# Patient Record
Sex: FEMALE | Race: WHITE | Employment: FULL TIME | URBAN - NONMETROPOLITAN AREA
[De-identification: names, ages, dates, MRNs, and addresses within clinical notes are randomized per-mention and may not be internally consistent; named-entity substitution may affect disease eponyms.]

---

## 2017-09-20 ENCOUNTER — HOSPITAL ENCOUNTER (EMERGENCY)
Age: 47
Discharge: HOME | End: 2017-09-20
Payer: COMMERCIAL

## 2017-09-20 VITALS
WEIGHT: 140 LBS | BODY MASS INDEX: 24.8 KG/M2 | DIASTOLIC BLOOD PRESSURE: 74 MMHG | RESPIRATION RATE: 16 BRPM | SYSTOLIC BLOOD PRESSURE: 126 MMHG | TEMPERATURE: 97.8 F | HEART RATE: 88 BPM | HEIGHT: 63 IN | OXYGEN SATURATION: 96 %

## 2017-09-20 DIAGNOSIS — J00 ACUTE NASOPHARYNGITIS: Primary | ICD-10-CM

## 2017-09-20 PROCEDURE — 99213 OFFICE O/P EST LOW 20 MIN: CPT | Performed by: NURSE PRACTITIONER

## 2017-09-20 PROCEDURE — 99212 OFFICE O/P EST SF 10 MIN: CPT

## 2017-09-20 RX ORDER — OMEGA-3/DHA/EPA/FISH OIL 1000 MG
1 CAPSULE ORAL DAILY
Qty: 30 CAPSULE | Refills: 0 | Status: SHIPPED | OUTPATIENT
Start: 2017-09-20 | End: 2017-10-20

## 2017-09-20 RX ORDER — AMOXICILLIN 500 MG/1
500 CAPSULE ORAL 3 TIMES DAILY
Qty: 30 CAPSULE | Refills: 0 | Status: SHIPPED | OUTPATIENT
Start: 2017-09-20 | End: 2017-09-30

## 2017-09-20 ASSESSMENT — ENCOUNTER SYMPTOMS
COUGH: 1
DIARRHEA: 0
ABDOMINAL PAIN: 0
SHORTNESS OF BREATH: 0
RHINORRHEA: 1
STRIDOR: 0
SINUS PRESSURE: 0
SINUS CONGESTION: 1
TROUBLE SWALLOWING: 0
CHOKING: 0
SORE THROAT: 1
EYE DISCHARGE: 0
CHEST TIGHTNESS: 0
VOICE CHANGE: 1
NAUSEA: 0

## 2017-09-20 ASSESSMENT — PAIN DESCRIPTION - ONSET: ONSET: GRADUAL

## 2017-09-20 ASSESSMENT — PAIN DESCRIPTION - PAIN TYPE: TYPE: ACUTE PAIN

## 2017-09-20 ASSESSMENT — PAIN DESCRIPTION - LOCATION: LOCATION: THROAT

## 2017-09-20 ASSESSMENT — PAIN SCALES - GENERAL: PAINLEVEL_OUTOF10: 2

## 2017-09-20 ASSESSMENT — PAIN DESCRIPTION - FREQUENCY: FREQUENCY: CONTINUOUS

## 2017-09-20 ASSESSMENT — PAIN DESCRIPTION - DESCRIPTORS: DESCRIPTORS: ACHING

## 2017-09-20 ASSESSMENT — PAIN DESCRIPTION - PROGRESSION: CLINICAL_PROGRESSION: GRADUALLY WORSENING

## 2017-12-18 ENCOUNTER — HOSPITAL ENCOUNTER (OUTPATIENT)
Dept: WOMENS IMAGING | Age: 47
Discharge: HOME | End: 2017-12-18
Payer: COMMERCIAL

## 2017-12-18 DIAGNOSIS — Z12.39 BREAST SCREENING: ICD-10-CM

## 2017-12-18 PROCEDURE — 77063 BREAST TOMOSYNTHESIS BI: CPT

## 2017-12-19 ENCOUNTER — OFFICE VISIT (OUTPATIENT)
Dept: FAMILY MEDICINE CLINIC | Age: 47
End: 2017-12-19
Payer: COMMERCIAL

## 2017-12-19 VITALS
OXYGEN SATURATION: 98 % | DIASTOLIC BLOOD PRESSURE: 64 MMHG | HEIGHT: 64 IN | HEART RATE: 75 BPM | WEIGHT: 144 LBS | TEMPERATURE: 98 F | SYSTOLIC BLOOD PRESSURE: 112 MMHG | BODY MASS INDEX: 24.59 KG/M2

## 2017-12-19 DIAGNOSIS — Z13.220 SCREENING FOR HYPERLIPIDEMIA: ICD-10-CM

## 2017-12-19 DIAGNOSIS — Z00.00 WELLNESS EXAMINATION: Primary | ICD-10-CM

## 2017-12-19 LAB
ALBUMIN SERPL-MCNC: 4.7 G/DL (ref 3.5–5.1)
ALP BLD-CCNC: 49 U/L (ref 38–126)
ALT SERPL-CCNC: 15 U/L (ref 11–66)
ANION GAP SERPL CALCULATED.3IONS-SCNC: 16 MEQ/L (ref 8–16)
AST SERPL-CCNC: 19 U/L (ref 5–40)
BACTERIA: NORMAL
BASOPHILS # BLD: 0.5 %
BASOPHILS ABSOLUTE: 0 THOU/MM3 (ref 0–0.1)
BILIRUB SERPL-MCNC: 1.7 MG/DL (ref 0.3–1.2)
BILIRUBIN URINE: NEGATIVE
BLOOD, URINE: NEGATIVE
BUN BLDV-MCNC: 11 MG/DL (ref 7–22)
CALCIUM SERPL-MCNC: 9.2 MG/DL (ref 8.5–10.5)
CASTS: NORMAL /LPF
CASTS: NORMAL /LPF
CHARACTER, URINE: NORMAL
CHLORIDE BLD-SCNC: 101 MEQ/L (ref 98–111)
CHOLESTEROL, FASTING: 209 MG/DL (ref 100–199)
CO2: 23 MEQ/L (ref 23–33)
COLOR: YELLOW
CREAT SERPL-MCNC: 0.7 MG/DL (ref 0.4–1.2)
CRYSTALS: NORMAL
EOSINOPHIL # BLD: 1.6 %
EOSINOPHILS ABSOLUTE: 0.1 THOU/MM3 (ref 0–0.4)
EPITHELIAL CELLS, UA: NORMAL /HPF
GFR SERPL CREATININE-BSD FRML MDRD: 90 ML/MIN/1.73M2
GLUCOSE BLD-MCNC: 95 MG/DL (ref 70–108)
GLUCOSE, URINE: NEGATIVE MG/DL
HCT VFR BLD CALC: 44 % (ref 37–47)
HDLC SERPL-MCNC: 82 MG/DL
HEMOGLOBIN: 14.7 GM/DL (ref 12–16)
KETONES, URINE: NEGATIVE
LDL CHOLESTEROL CALCULATED: 106 MG/DL
LEUKOCYTE ESTERASE, URINE: NEGATIVE
LYMPHOCYTES # BLD: 23.4 %
LYMPHOCYTES ABSOLUTE: 1.1 THOU/MM3 (ref 1–4.8)
MCH RBC QN AUTO: 30.7 PG (ref 27–31)
MCHC RBC AUTO-ENTMCNC: 33.5 GM/DL (ref 33–37)
MCV RBC AUTO: 91.7 FL (ref 81–99)
MISCELLANEOUS LAB TEST RESULT: NORMAL
MONOCYTES # BLD: 9 %
MONOCYTES ABSOLUTE: 0.4 THOU/MM3 (ref 0.4–1.3)
NITRITE, URINE: NEGATIVE
NUCLEATED RED BLOOD CELLS: 0 /100 WBC
PDW BLD-RTO: 13.3 % (ref 11.5–14.5)
PH UA: 7
PLATELET # BLD: 219 THOU/MM3 (ref 130–400)
PMV BLD AUTO: 8.4 MCM (ref 7.4–10.4)
POTASSIUM SERPL-SCNC: 4 MEQ/L (ref 3.5–5.2)
PROTEIN UA: NEGATIVE MG/DL
RBC # BLD: 4.79 MILL/MM3 (ref 4.2–5.4)
RBC URINE: NORMAL /HPF
RENAL EPITHELIAL, UA: NORMAL
SEG NEUTROPHILS: 65.5 %
SEGMENTED NEUTROPHILS ABSOLUTE COUNT: 3.1 THOU/MM3 (ref 1.8–7.7)
SODIUM BLD-SCNC: 140 MEQ/L (ref 135–145)
SPECIFIC GRAVITY UA: 1.02 (ref 1–1.03)
TOTAL PROTEIN: 7.9 G/DL (ref 6.1–8)
TRIGLYCERIDE, FASTING: 103 MG/DL (ref 0–199)
UROBILINOGEN, URINE: 0.2 EU/DL
WBC # BLD: 4.7 THOU/MM3 (ref 4.8–10.8)
WBC UA: NORMAL /HPF
YEAST: NORMAL

## 2017-12-19 PROCEDURE — 90471 IMMUNIZATION ADMIN: CPT | Performed by: FAMILY MEDICINE

## 2017-12-19 PROCEDURE — 90686 IIV4 VACC NO PRSV 0.5 ML IM: CPT | Performed by: FAMILY MEDICINE

## 2017-12-19 PROCEDURE — 99396 PREV VISIT EST AGE 40-64: CPT | Performed by: FAMILY MEDICINE

## 2017-12-19 ASSESSMENT — ENCOUNTER SYMPTOMS
STRIDOR: 0
BACK PAIN: 1
WHEEZING: 0
PHOTOPHOBIA: 0
SORE THROAT: 0
EYE DISCHARGE: 0
APNEA: 0
CHEST TIGHTNESS: 0
BLOOD IN STOOL: 0
VOMITING: 0
EYE ITCHING: 0
FACIAL SWELLING: 0
ABDOMINAL DISTENTION: 0
EYE REDNESS: 0
SHORTNESS OF BREATH: 0
CONSTIPATION: 0
RECTAL PAIN: 0
COLOR CHANGE: 0
CHOKING: 0
ABDOMINAL PAIN: 0
DIARRHEA: 0
SINUS PRESSURE: 0
TROUBLE SWALLOWING: 0
EYE PAIN: 0
COUGH: 0
RHINORRHEA: 0
ANAL BLEEDING: 0
VOICE CHANGE: 0
NAUSEA: 0

## 2017-12-19 NOTE — PATIENT INSTRUCTIONS
You may receive a survey about your visit with us today. The feedback from our patients helps us identify what is working well and where the service to all patients can be enhanced. Thank you! Patient Education        Learning About Easton Patterson  What is a living will? A living will is a legal form you use to write down the kind of care you want at the end of your life. It is used by the health professionals who will treat you if you aren't able to decide for yourself. If you put your wishes in writing, your loved ones and others will know what kind of care you want. They won't need to guess. This can ease your mind and be helpful to others. A living will is not the same as an estate or property will. An estate will explains what you want to happen with your money and property after you die. Is a living will a legal document? A living will is a legal document. Each state has its own laws about living nielsen. If you move to another state, make sure that your living will is legal in the state where you now live. Or you might use a universal form that has been approved by many states. This kind of form can sometimes be completed and stored online. Your electronic copy will then be available wherever you have a connection to the Internet. In most cases, doctors will respect your wishes even if you have a form from a different state. · You don't need an  to complete a living will. But legal advice can be helpful if your state's laws are unclear, your health history is complicated, or your family can't agree on what should be in your living will. · You can change your living will at any time. Some people find that their wishes about end-of-life care change as their health changes. · In addition to making a living will, think about completing a medical power of  form.  This form lets you name the person you want to make end-of-life treatment decisions for you (your \"health care agent\") if you're you, such as signs of a severe allergic reaction, very high fever, or unusual behavior. Signs of a severe allergic reaction can include hives, swelling of the face and throat, difficulty breathing, a fast heartbeat, dizziness, and weakness - usually within a few minutes to a few hours after the vaccination. What should I do? · If you think it is a severe allergic reaction or other emergency that can't wait, call 9-1-1 and get the person to the nearest hospital. Otherwise, call your doctor. · Reactions should be reported to the \"Vaccine Adverse Event Reporting System\" (VAERS). Your doctor should file this report, or you can do it yourself through the VAERS website at www.vaers. [facility].gov, or by calling 5-217.210.3506. VAERS does not give medical advice. The National Vaccine Injury Compensation Program  The National Vaccine Injury Compensation Program (VICP) is a federal program that was created to compensate people who may have been injured by certain vaccines. Persons who believe they may have been injured by a vaccine can learn about the program and about filing a claim by calling 6-274.289.5779 or visiting the [employer] website at www.[facility].gov/vaccinecompensation. There is a time limit to file a claim for compensation. How can I learn more? · Ask your healthcare provider. He or she can give you the vaccine package insert or suggest other sources of information. · Call your local or state health department. · Contact the Centers for Disease Control and Prevention (CDC):  ¨ Call 1-947.826.8252 (1-800-CDC-INFO) or  ¨ Visit CDC's website at www.cdc.gov/flu  Vaccine Information Statement  Inactivated Influenza Vaccine  8/7/2015)  42 MINGMati Arambula 164IJ-22  Department of Health and Human Services  Centers for Disease Control and Prevention  Many Vaccine Information Statements are available in Mongolian and other languages. See www.immunize.org/vis.   Muchas hojas de información sobre vacunas están disponibles en español y en michelle idiomas. Visite www.immunize.org/vis. Care instructions adapted under license by [facility] ([facility]). If you have questions about a medical condition or this instruction, always ask your healthcare professional. [address] any warranty or liability for your use of this information. Patient Education        Well Visit, Ages 25 to 48: Care Instructions  Your Care Instructions    Physical exams can help you stay healthy. Your doctor has checked your overall health and may have suggested ways to take good care of yourself. He or she also may have recommended tests. At home, you can help prevent illness with healthy eating, regular exercise, and other steps. Follow-up care is a key part of your treatment and safety. Be sure to make and go to all appointments, and call your doctor if you are having problems. It's also a good idea to know your test results and keep a list of the medicines you take. How can you care for yourself at home? · Reach and stay at a healthy weight. This will lower your risk for many problems, such as obesity, diabetes, heart disease, and high blood pressure. · Get at least 30 minutes of physical activity on most days of the week. Walking is a good choice. You also may want to do other activities, such as running, swimming, cycling, or playing tennis or team sports. Discuss any changes in your exercise program with your doctor. · Do not smoke or allow others to smoke around you. If you need help quitting, talk to your doctor about stop-smoking programs and medicines. These can increase your chances of quitting for good. · Talk to your doctor about whether you have any risk factors for sexually transmitted infections (STIs). Having one sex partner (who does not have STIs and does not have sex with anyone else) is a good way to avoid these infections. · Use birth control if you do not want to have children at this time.  Talk with your doctor about the choices available and what might be best for you. · Protect your skin from too much sun. When you're outdoors from 10 a.m. to 4 p.m., stay in the shade or cover up with clothing and a hat with a wide brim. Wear sunglasses that block UV rays. Even when it's cloudy, put broad-spectrum sunscreen (SPF 30 or higher) on any exposed skin. · See a dentist one or two times a year for checkups and to have your teeth cleaned. · Wear a seat belt in the car. · Drink alcohol in moderation, if at all. That means no more than 2 drinks a day for men and 1 drink a day for women. Follow your doctor's advice about when to have certain tests. These tests can spot problems early. For everyone  · Cholesterol. Have the fat (cholesterol) in your blood tested after age 21. Your doctor will tell you how often to have this done based on your age, family history, or other things that can increase your risk for heart disease. · Blood pressure. Have your blood pressure checked during a routine doctor visit. Your doctor will tell you how often to check your blood pressure based on your age, your blood pressure results, and other factors. · Vision. Talk with your doctor about how often to have a glaucoma test.  · Diabetes. Ask your doctor whether you should have tests for diabetes. · Colon cancer. Have a test for colon cancer at age 48. You may have one of several tests. If you are younger than 48, you may need a test earlier if you have any risk factors. Risk factors include whether you already had a precancerous polyp removed from your colon or whether your parent, brother, sister, or child has had colon cancer. For women  · Breast exam and mammogram. Talk to your doctor about when you should have a clinical breast exam and a mammogram. Medical experts differ on whether and how often women under 50 should have these tests. Your doctor can help you decide what is right for you. · Pap test and pelvic exam. Begin Pap tests at age 24.  A Pap test is the best way to find cervical cancer. The test often is part of a pelvic exam. Ask how often to have this test.  · Tests for sexually transmitted infections (STIs). Ask whether you should have tests for STIs. You may be at risk if you have sex with more than one person, especially if your partners do not wear condoms. For men  · Tests for sexually transmitted infections (STIs). Ask whether you should have tests for STIs. You may be at risk if you have sex with more than one person, especially if you do not wear a condom. · Testicular cancer exam. Ask your doctor whether you should check your testicles regularly. · Prostate exam. Talk to your doctor about whether you should have a blood test (called a PSA test) for prostate cancer. Experts differ on whether and when men should have this test. Some experts suggest it if you are older than 39 and are -American or have a father or brother who got prostate cancer when he was younger than 72. When should you call for help? Watch closely for changes in your health, and be sure to contact your doctor if you have any problems or symptoms that concern you. Where can you learn more? Go to https://PeopleDoc.yuback. org and sign in to your [employer] account. Enter P072 in the Client Outlook box to learn more about \"Well Visit, Ages 25 to 48: Care Instructions. \"     If you do not have an account, please click on the \"Sign Up Now\" link. Current as of: May 12, 2017  Content Version: 11.4  © 8344-3569 Aveso. Care instructions adapted under license by [facility] ([facility]). If you have questions about a medical condition or this instruction, always ask your healthcare professional. [place] [address] any warranty or liability for your use of this information. Patient Education        Breast Self-Exam: Care Instructions  Your Care Instructions    A breast self-exam is when you check your breasts for lumps or changes. line, and from the armpit to the ribs. Repeat until you have covered the entire breast.  ¨ Repeat this procedure for your left breast, using the pads of the 3 middle fingers of your right hand. · To examine your breasts while in the shower:  ¨ Place one arm over your head and lightly soap your breast on that side. ¨ Using the pads of your fingers, gently move your hand over your breast (in the strip pattern described above), feeling carefully for any lumps or changes. ¨ Repeat for the other breast.  · Have your doctor inspect anything you notice to see if you need further testing. Where can you learn more? Go to [email or web address] [email or web address] org and sign in to your [employer] account. Enter P148 in the [employer] box to learn more about \"Breast Self-Exam: Care Instructions. \"     If you do not have an account, please click on the \"Sign Up Now\" link. Current as of: May 12, 2017  Content Version: 11.4  © 0563-8973 [email or web address]. Care instructions adapted under license by [facility] ([facility]). If you have questions about a medical condition or this instruction, always ask your healthcare professional. [place] [address] any warranty or liability for your use of this information. Patient Education        Learning About Calcium  What is calcium? Calcium keeps your bones and muscles-including your heart-healthy and strong. Your body needs vitamin D to absorb calcium. People who don't get enough calcium and vitamin D throughout life have an increased chance of having thin and brittle bones (osteoporosis) in their later years. Thin and brittle bones break easily. They can lead to serious injuries. This is why it's important for you to get enough calcium and vitamin D as a child and as an adult. It helps keep your bones strong as you get older. And it protects you against possible breaks. Your body also uses vitamin D to help your muscles absorb calcium and work well.  If your muscles don't get enough calcium, then they can cramp, hurt, or feel weak. You may have long-term (chronic) muscle aches and pains. How much calcium do you need? How much calcium you need each day changes as you age. The [place] of Medicine recommends the following amounts of calcium each day. · Ages 1 to 3 years: 700 milligrams  · Ages 4 to 8 years: 1,000 milligrams  · Ages 5 to 25 years: 1,300 milligrams  · Ages 23 to 48 years: 1,000 milligrams  · Males 46 to 79 years: 1,000 milligrams  · Females 46 to 79 years: 1,200 milligrams  · Ages 70 and older: 1,200 milligrams  Women who are pregnant or breastfeeding need the same amount of calcium and vitamin D as other women their age. How can you get enough calcium? Calcium is in foods such as milk, cheese, and yogurt. Vegetables like broccoli, kale, and Chinese cabbage also have it. You can get calcium if you eat the soft edible bones in canned sardines and canned salmon. Foods with added (fortified) calcium include some cereals, juices, soy drinks, and tofu. The food label will show how much of it was added. You can figure out how much calcium is in a food by looking at the percent daily value section on the nutrition facts label. The food label assumes the daily value of calcium is 1,000 mg. So if one serving of a food has a daily value of 20% of calcium, that food has 200 mg of calcium in one serving. Some people who don't get enough calcium may need supplements. You can buy them as citrate or carbonate. Calcium carbonate is best absorbed when it is taken with food. Calcium citrate can be absorbed well with or without food. Spreading calcium out over the course of the day can reduce stomach upset. And your body absorbs it better when it is spread over the day. Try not to take more than 500 mg of calcium supplement at a time. Where can you learn more? Go to https://norman.healthBluebox. org and sign in to your [employer] account.  Enter S264 in the Search Health Information box to learn more about \"Learning About Calcium. \"     If you do not have an account, please click on the \"Sign Up Now\" link. Current as of: May 12, 2017  Content Version: 11.4  © 0860-5512 [employer]. Care instructions adapted under license by [facility] ([facility]). If you have questions about a medical condition or this instruction, always ask your healthcare professional. [address] any warranty or liability for your use of this information. Patient Education        Learning About Vitamin D  Why is it important to get enough vitamin D? Your body needs vitamin D to absorb calcium. Calcium keeps your bones and muscles, including your heart, healthy and strong. If your muscles don't get enough calcium, they can cramp, hurt, or feel weak. You may have long-term (chronic) muscle aches and pains. If you don't get enough vitamin D throughout life, you have an increased chance of having thin and brittle bones (osteoporosis) in your later years. Children who don't get enough vitamin D may not grow as much as others their age. They also have a chance of getting a rare disease called rickets. It causes weak bones. Vitamin D and calcium are added to many foods. And your body uses sunshine to make its own vitamin D. How much vitamin D do you need? The [place] of Medicine recommends that people ages 3 through 79 get 600 IU (international units) every day. Adults 71 and older need 800 IU every day. Blood tests for vitamin D can check your vitamin D level. But there is no standard normal range used by all laboratories. The [place] of Medicine recommends a blood level of 20 ng/mL of vitamin D for healthy bones. And most people in the [place] and [facility] ([facility]) meet this goal.  How can you get more vitamin D? Foods that contain vitamin D include:  · [place], tuna, and mackerel.  These are some of the best foods to eat when you need to get more vitamin D.  · Cheese, egg yolks, and beef liver. These foods have vitamin D in small amounts. · Milk, soy drinks, orange juice, yogurt, margarine, and some kinds of cereal have vitamin D added to them. Some people don't make vitamin D as well as others. They may have to take extra care in getting enough vitamin D. Things that reduce how much vitamin D your body makes include:  · Dark skin, such as many  Americans have. · Age, especially if you are older than 72. · Digestive problems, such as Crohn's or celiac disease. · Liver and kidney disease. Some people who do not get enough vitamin D may need supplements. Are there any risks from taking vitamin D?  · Too much vitamin D:  ¨ Can damage your kidneys. ¨ Can cause nausea and vomiting, constipation, and weakness. ¨ Raises the amount of calcium in your blood. If this happens, you can get confused or have an irregular heart rhythm. · Vitamin D may interact with other medicines. Tell your doctor about all of the medicines you take, including over-the-counter drugs, herbs, and pills. Tell your doctor about all of your current medical problems. Where can you learn more? Go to https://Green Dot Corporationpepiceweb.[facility]. org and sign in to your [employer] account. Enter [id number] in the [facility] box to learn more about \"Learning About Vitamin D. \"     If you do not have an account, please click on the \"Sign Up Now\" link. Current as of: May 12, 2017  Content Version: 11.4  © 9561-0860 [employer]. Care instructions adapted under license by [facility] ([facility]). If you have questions about a medical condition or this instruction, always ask your healthcare professional. [place] [address] any warranty or liability for your use of this information. Patient Education        Stretching: Exercises  Your Care Instructions  Here are some examples of exercises for stretching. Start each exercise slowly.  Ease off the exercise if you start to have pain. Your doctor or physical therapist will tell you when you can start these exercises and which ones will work best for you. How to do the exercises  Latissimus stretch    1. Stand with your back straight and your feet shoulder-width apart. You can do this stretch sitting down if you are not steady on your feet. 2. Hold your arms above your head, and hold one hand with the other. 3. Pull upward while leaning straight over toward your right side. Keep your lower body straight. You should feel the stretch along your left side. 4. Hold 15 to 30 seconds, and then switch sides. 5. Repeat 2 to 4 times for each side. Triceps stretch    1. Stand with your back straight and your feet shoulder-width apart. You can do this stretch sitting down if you are not steady on your feet. 2. Bring your left elbow straight up while bending your arm. 3. Grab your left elbow with your right hand, and pull your left elbow toward your head with light pressure. If you are more flexible, you may pull your arm slightly behind your head. You will feel the stretch along the back of your arm. 4. Hold 15 to 30 seconds, and then switch elbows. 5. Repeat 2 to 4 times for each arm. Calf stretch    1. Place your hands on a wall for balance. You can also do this with your hands on the back of a chair, a countertop, or a tree. 2. Step back with your left leg. Keep the leg straight, and press your left heel into the floor. 3. Press your hips forward, bending your right leg slightly. You will feel the stretch in your left calf. 4. Hold the stretch 15 to 30 seconds. 5. Repeat 2 to 4 times for each leg. Quadriceps stretch    1. Lie on your side with one hand supporting your head. 2. Bend your upper leg back and grab your ankle with your other hand. 3. Stretch your leg back by pulling your foot toward your buttocks. You will feel the stretch in the front of your thigh.  If this causes stress on your knees, do not do this stretch. 4. Hold the stretch 15 to 30 seconds. 5. Repeat 2 to 4 times for each leg. Groin stretch    1. Sit on the floor and put the soles of your feet together. Do not slump your back. 2. Grab your ankles and gently pull your legs toward you. 3. Press your knees toward the floor. You will feel the stretch in your inner thighs. 4. Hold 15 to 30 seconds. 5. Repeat 2 to 4 times. Hamstring stretch in doorway    1. Lie on the floor near a doorway, with your buttocks close to the wall. 2. Let the leg you are not stretching extend through the doorway. 3. Put the leg you want to stretch up on the wall, and straighten your knee to feel a gentle stretch at the back of your leg. 4. Hold the stretch for at least 15 to 30 seconds. Repeat 2 to 4 times. Follow-up care is a key part of your treatment and safety. Be sure to make and go to all appointments, and call your doctor if you are having problems. It's also a good idea to know your test results and keep a list of the medicines you take. Where can you learn more? Go to https://Who Can Fix My CarpeBuildDirect."Ghostery[employer] Inc.". org and sign in to your [employer] account. Enter 764 [id number] in the Wallmob box to learn more about \"Stretching: Exercises. \"     If you do not have an account, please click on the \"Sign Up Now\" link. Current as of: March 13, 2017  Content Version: 11.4  © 6467-2167 Phantom. Care instructions adapted under license by [facility] ([facility]). If you have questions about a medical condition or this instruction, always ask your healthcare professional. [place] [address] any warranty or liability for your use of this information. Patient Education        Healthy Upper Back: Exercises  Your Care Instructions  Here are some examples of exercises for your upper back. Start each exercise slowly. Ease off the exercise if you start to have pain.   Your doctor or physical therapist will tell you when you can start these exercises and need elastic exercise material, such as surgical tubing or Thera-Band. 5. Put the band around a solid object, such as a bedpost, at about waist level. Hold one end of the band in each hand. 6. With your elbows at your sides and bent to 90 degrees, pull the band back to move your shoulder blades toward each other. Return to the starting position. 7. Repeat 8 to 12 times. Follow-up care is a key part of your treatment and safety. Be sure to make and go to all appointments, and call your doctor if you are having problems. It's also a good idea to know your test results and keep a list of the medicines you take. Where can you learn more? Go to https://EVO Media GrouppeAmbient Control Systems.ToyTalk. org and sign in to your Geeklist account. Enter W588 in the Right Media box to learn more about \"Healthy Upper Back: Exercises. \"     If you do not have an account, please click on the \"Sign Up Now\" link. Current as of: March 21, 2017  Content Version: 11.4  © 9681-6311 Versa. Care instructions adapted under license by [facility] ([facility]). If you have questions about a medical condition or this instruction, always ask your healthcare professional. [place] [address] any warranty or liability for your use of this information. Patient Education        Back Stretches: Exercises  Your Care Instructions  Here are some examples of exercises for stretching your back. Start each exercise slowly. Ease off the exercise if you start to have pain. Your doctor or physical therapist will tell you when you can start these exercises and which ones will work best for you. How to do the exercises  Overhead stretch    11. Stand comfortably with your feet shoulder-width apart. 12. Looking straight ahead, raise both arms over your head and reach toward the ceiling. Do not allow your head to tilt back. 13. Hold for 15 to 30 seconds, then lower your arms to your sides. 14. Repeat 2 to 4 times.   Side extension    14. Sit tall on the ball with your feet planted in front of you, hip-width apart. As you do this exercise, avoid slumping your shoulders and arching your back. 15. Rest your hands on the ball near your hip or a steady object next to you. (If you feel very stable on the ball, rest your hands in your lap or at your side.)  16. Slowly straighten one leg at the knee. Slowly lower it back down. Repeat with the other leg. 17. Repeat this exercise 8 to 12 times. Roll-ups    10. Lie on your back with your knees bent, feet resting on the floor. 11. Lay the ball on your thighs. Rest your hands up high on the ball. 12. Raising your head and shoulder blades, roll the ball up your thighs. Exhale as you roll up. 13. If this is hard on your neck, gently support your lower head and upper neck with one hand. Don't use that hand to pull your head up. 14. Repeat 8 to 12 times. Ball curls    8. Lie on your back with your ankles resting on the ball, knees straight. 9. Use your legs to roll the exercise ball toward you. Allow your knees to bend and move closer to your chest.  10. Pause briefly, and then roll the ball to the starting position. Try to keep the ball rolling straight. You will feel the muscles in your lower belly working. 11. Repeat 8 to 12 times. Bridge with ball under legs    1. Lie on your back with your legs up, calves resting on the ball. For more challenge, rest your heels on the ball. 2. Look up at the ceiling, and keep your chin relaxed. You can place a small pillow under your head or neck for comfort. 3. With your arms by your side, press your hands onto the floor for stability. 4. Tighten your belly muscles by pulling in your belly button toward your spine. 5. Push your heels down toward the floor, squeeze your buttocks, and lift your hips off the floor until your shoulders, hips, and knees are all in a straight line. 6. Try to keep the ball steady.  Hold for about 6 seconds as you continue to breathe normally. 7. Slowly lower your hips back down to the floor. 8. Repeat 8 to 12 times. Ball curls with bridge    1. Start flat on your back with your ankles resting on the ball. 2. Look up at the ceiling, and keep your chin relaxed. You can place a small pillow under your head or neck for comfort. 3. With your arms by your side, press your hands onto the floor for stability. 4. Tighten your belly muscles by pulling in your belly button toward your spine. 5. Push your heels down toward the floor, squeeze your buttocks, and lift your hips off the floor until your shoulders, hips, and knees are all in a straight line. 6. While holding the bridge position, roll the ball toward you with your heels. Keep your hips as level as you can. 7. Pause briefly, and then roll the ball back out. Try to keep the ball rolling straight. You will feel the muscles in your lower belly working as you straighten your legs. 8. Lower your hips, and return to your starting position. 9. Repeat 8 to 12 times. 10. When you can keep your body and the ball steady throughout this exercise, you're ready for more challenge. Try keeping your hips raised while rolling the ball out, holding the bridge, and rolling back, a few times in a row. Praying jannie    1. Kneel upright with the ball in front of you. 2. To start, clasp your hands together. Rest them on the ball in front of you. 3. As you do this exercise, keep your back and hips straight and tighten your belly and buttocks muscles. Keep your knees in place. 4. Press on the ball with your arms. Lean forward from the knees. This rolls the ball forward. You will bear most of your weight on your arms. 5. If your back starts to ache, you've gone too far. Pull back a bit. 6. Roll back to the start position. 7. Repeat 8 to 12 times. Walk-out plank on ball    1. Kneel over the ball. Place your hands on the floor in front of you.   2. Walk your hands forward until your to 30 seconds, then relax. 25. Repeat 2 to 4 times. Alternate arm and leg (bird dog) exercise    Do this exercise slowly. Try to keep your body straight at all times, and do not let one hip drop lower than the other. 19. Start on the floor, on your hands and knees. 20. Tighten your belly muscles. 21. Raise one leg off the floor, and hold it straight out behind you. Be careful not to let your hip drop down, because that will twist your trunk. 22. Hold for about 6 seconds, then lower your leg and switch to the other leg. 23. Repeat 8 to 12 times on each leg. 24. Over time, work up to holding for 10 to 30 seconds each time. 25. If you feel stable and secure with your leg raised, try raising the opposite arm straight out in front of you at the same time. Knee-to-chest exercise    18. Lie on your back with your knees bent and your feet flat on the floor. 19. Bring one knee to your chest, keeping the other foot flat on the floor (or keeping the other leg straight, whichever feels better on your lower back). 20. Keep your lower back pressed to the floor. Hold for at least 15 to 30 seconds. 21. Relax, and lower the knee to the starting position. 22. Repeat with the other leg. Repeat 2 to 4 times with each leg. 23. To get more stretch, put your other leg flat on the floor while pulling your knee to your chest.  Curl-ups    18. Lie on the floor on your back with your knees bent at a 90-degree angle. Your feet should be flat on the floor, about 12 inches from your buttocks. 23. Cross your arms over your chest. If this bothers your neck, try putting your hands behind your neck (not your head), with your elbows spread apart. 20. Slowly tighten your belly muscles and raise your shoulder blades off the floor. 21. Keep your head in line with your body, and do not press your chin to your chest.  22. Hold this position for 1 or 2 seconds, then slowly lower yourself back down to the floor.   23. Repeat 8 to 12 times. Pelvic tilt exercise    15. Lie on your back with your knees bent. 16. \"Brace\" your stomach. This means to tighten your muscles by pulling in and imagining your belly button moving toward your spine. You should feel like your back is pressing to the floor and your hips and pelvis are rocking back. 17. Hold for about 6 seconds while you breathe smoothly. 18. Repeat 8 to 12 times. Heel dig bridging    12. Lie on your back with both knees bent and your ankles bent so that only your heels are digging into the floor. Your knees should be bent about 90 degrees. 13. Then push your heels into the floor, squeeze your buttocks, and lift your hips off the floor until your shoulders, hips, and knees are all in a straight line. 14. Hold for about 6 seconds as you continue to breathe normally, and then slowly lower your hips back down to the floor and rest for up to 10 seconds. 15. Do 8 to 12 repetitions. Hamstring stretch in doorway    9. Lie on your back in a doorway, with one leg through the open door. 10. Slide your leg up the wall to straighten your knee. You should feel a gentle stretch down the back of your leg. 11. Hold the stretch for at least 15 to 30 seconds. Do not arch your back, point your toes, or bend either knee. Keep one heel touching the floor and the other heel touching the wall. 12. Repeat with your other leg. 13. Do 2 to 4 times for each leg. Hip flexor stretch    11. Kneel on the floor with one knee bent and one leg behind you. Place your forward knee over your foot. Keep your other knee touching the floor. 12. Slowly push your hips forward until you feel a stretch in the upper thigh of your rear leg. 13. Hold the stretch for at least 15 to 30 seconds. Repeat with your other leg. 14. Do 2 to 4 times on each side. Wall sit    8. Stand with your back 10 to 12 inches away from a wall. 9. Lean into the wall until your back is flat against it.   10. Slowly slide down until your knees are

## 2017-12-19 NOTE — PROGRESS NOTES
Venipuncture obtained from right arm. Patient tolerated the procedure without complication or complaint.

## 2017-12-19 NOTE — PROGRESS NOTES
Wellness Visit    Patient:  Mary Cespedes  Date:  12/19/2017    Subjective:  Review of Systems   Constitutional: Negative for activity change, appetite change, chills, diaphoresis, fatigue, fever and unexpected weight change. Negative for hot flashes, malaise, night sweats, hemoptysis, and heartburn. HENT: Negative for congestion, dental problem, drooling, ear discharge, ear pain, facial swelling, hearing loss, mouth sores, nosebleeds, postnasal drip, rhinorrhea, sinus pressure, sneezing, sore throat, tinnitus, trouble swallowing and voice change. Eyes: Negative for photophobia, pain, discharge, redness, itching and visual disturbance. Respiratory: Negative for apnea, cough, choking, chest tightness, shortness of breath, wheezing and stridor. Cardiovascular: Negative for chest pain, palpitations and leg swelling. Gastrointestinal: Negative for abdominal distention, abdominal pain, anal bleeding, blood in stool, constipation, diarrhea, nausea, rectal pain and vomiting. Endocrine: Negative for cold intolerance, heat intolerance, polydipsia, polyphagia and polyuria. Genitourinary: Positive for enuresis. Negative for decreased urine volume, difficulty urinating, dyspareunia, dysuria, flank pain, frequency, genital sores, hematuria, menstrual problem, pelvic pain, urgency, vaginal bleeding, vaginal discharge and vaginal pain. Musculoskeletal: Positive for back pain. Negative for arthralgias, gait problem, joint swelling, myalgias, neck pain and neck stiffness. Skin: Negative for color change, pallor, rash and wound. Allergic/Immunologic: Negative for environmental allergies, food allergies and immunocompromised state. Neurological: Negative for dizziness, tremors, seizures, syncope, facial asymmetry, speech difficulty, weakness, light-headedness, numbness and headaches. Hematological: Negative for adenopathy. Does not bruise/bleed easily.    Psychiatric/Behavioral: Negative for agitation, behavioral problems, confusion, decreased concentration, dysphoric mood, hallucinations, self-injury, sleep disturbance and suicidal ideas. The patient is not nervous/anxious and is not hyperactive. Health Habits: With regard to her health habits, she eats 3 meals and 1 snacks per day. She does exercise regularly:   Physical activities include: RUNNING, 2 times per week, 45 minutes/day. She does not take over the counter vitamins. She never had a blood transfusion or tattoo. She wears seatbelts while riding a car. She does not text or talk on the phone while driving. She performs all of her ADL's without problem. She is independent, she cooks, drives, bathes, and gets dressed without assistance. She is . She has 2 children. She works 40-50 hours a week. She is happy with her life. She rates her stress level a 7 in a scale 1-10. Health Maintenance   Topic Date Due    HIV screen  02/05/1986    Cervical cancer screen  02/05/1992    Lipid screen  10/26/2017    DTaP/Tdap/Td vaccine (2 - Td) 10/26/2022    Flu vaccine  Completed       She  reports that she has never smoked. She has never used smokeless tobacco. She reports that she drinks about 3.0 oz of alcohol per week . She reports that she does not use drugs. .  She has had a colonoscopy. It was normal.  Her last mammogram was 1 days and it was normal.  Her last pap smear was 1  months and it was normal. She has not had a Bone Density. Her last menstrual cycle was 12 years ago. She is sexually active. She has had the same sexual partner for the last 24 years. She does perform regular breast self exams. PROVIDER NOTES     HPI:  Aubrey Noriega is a 56y.o. year old female, who comes today for an annual wellness visit. History reviewed. No pertinent past medical history.     Past Surgical History:   Procedure Laterality Date    BREAST SURGERY  8752    Silicone implants    ENDOMETRIAL ABLATION  2006    heavy

## 2017-12-22 ENCOUNTER — TELEPHONE (OUTPATIENT)
Dept: FAMILY MEDICINE CLINIC | Age: 47
End: 2017-12-22

## 2018-05-21 ENCOUNTER — OFFICE VISIT (OUTPATIENT)
Dept: FAMILY MEDICINE CLINIC | Age: 48
End: 2018-05-21
Payer: COMMERCIAL

## 2018-05-21 VITALS
SYSTOLIC BLOOD PRESSURE: 102 MMHG | TEMPERATURE: 97.6 F | DIASTOLIC BLOOD PRESSURE: 64 MMHG | BODY MASS INDEX: 25.87 KG/M2 | RESPIRATION RATE: 16 BRPM | HEART RATE: 76 BPM | WEIGHT: 146 LBS | HEIGHT: 63 IN | OXYGEN SATURATION: 99 %

## 2018-05-21 DIAGNOSIS — N30.00 ACUTE CYSTITIS WITHOUT HEMATURIA: Primary | ICD-10-CM

## 2018-05-21 LAB
BILIRUBIN, POC: NORMAL
BLOOD URINE, POC: NORMAL
CLARITY, POC: NORMAL
COLOR, POC: NORMAL
GLUCOSE URINE, POC: NORMAL
KETONES, POC: NORMAL
LEUKOCYTE EST, POC: NORMAL
NITRITE, POC: NORMAL
PH, POC: NORMAL
PROTEIN, POC: NORMAL
SPECIFIC GRAVITY, POC: NORMAL
UROBILINOGEN, POC: NORMAL

## 2018-05-21 PROCEDURE — G8427 DOCREV CUR MEDS BY ELIG CLIN: HCPCS | Performed by: FAMILY MEDICINE

## 2018-05-21 PROCEDURE — 81003 URINALYSIS AUTO W/O SCOPE: CPT | Performed by: FAMILY MEDICINE

## 2018-05-21 PROCEDURE — 99212 OFFICE O/P EST SF 10 MIN: CPT | Performed by: FAMILY MEDICINE

## 2018-05-21 PROCEDURE — 1036F TOBACCO NON-USER: CPT | Performed by: FAMILY MEDICINE

## 2018-05-21 PROCEDURE — G8419 CALC BMI OUT NRM PARAM NOF/U: HCPCS | Performed by: FAMILY MEDICINE

## 2018-05-21 RX ORDER — NITROFURANTOIN 25; 75 MG/1; MG/1
100 CAPSULE ORAL 2 TIMES DAILY
Qty: 14 CAPSULE | Refills: 0 | Status: SHIPPED | OUTPATIENT
Start: 2018-05-21 | End: 2018-05-28

## 2018-05-21 RX ORDER — FLAVOXATE HYDROCHLORIDE 100 MG/1
100 TABLET ORAL 3 TIMES DAILY PRN
Qty: 15 TABLET | Refills: 0 | Status: SHIPPED | OUTPATIENT
Start: 2018-05-21 | End: 2018-05-30 | Stop reason: SDUPTHER

## 2018-05-21 RX ORDER — NITROFURANTOIN 25; 75 MG/1; MG/1
100 CAPSULE ORAL 2 TIMES DAILY
Qty: 14 CAPSULE | Refills: 0 | Status: SHIPPED | OUTPATIENT
Start: 2018-05-21 | End: 2018-05-21 | Stop reason: CLARIF

## 2018-05-21 RX ORDER — FLAVOXATE HYDROCHLORIDE 100 MG/1
100 TABLET ORAL 3 TIMES DAILY PRN
Qty: 15 TABLET | Refills: 0 | Status: SHIPPED | OUTPATIENT
Start: 2018-05-21 | End: 2018-05-21 | Stop reason: CLARIF

## 2018-05-30 RX ORDER — FLAVOXATE HYDROCHLORIDE 100 MG/1
100 TABLET ORAL 3 TIMES DAILY PRN
Qty: 15 TABLET | Refills: 0 | Status: SHIPPED | OUTPATIENT
Start: 2018-05-30 | End: 2018-06-07 | Stop reason: SDUPTHER

## 2018-06-07 RX ORDER — FLAVOXATE HYDROCHLORIDE 100 MG/1
100 TABLET ORAL 3 TIMES DAILY PRN
Qty: 15 TABLET | Refills: 0 | Status: SHIPPED | OUTPATIENT
Start: 2018-06-07 | End: 2018-12-03

## 2018-12-03 ENCOUNTER — TELEPHONE (OUTPATIENT)
Dept: FAMILY MEDICINE CLINIC | Age: 48
End: 2018-12-03

## 2018-12-03 ENCOUNTER — HOSPITAL ENCOUNTER (EMERGENCY)
Age: 48
Discharge: HOME | End: 2018-12-03
Attending: FAMILY MEDICINE
Payer: COMMERCIAL

## 2018-12-03 VITALS
DIASTOLIC BLOOD PRESSURE: 88 MMHG | SYSTOLIC BLOOD PRESSURE: 134 MMHG | HEART RATE: 88 BPM | TEMPERATURE: 98 F | RESPIRATION RATE: 16 BRPM | OXYGEN SATURATION: 96 %

## 2018-12-03 DIAGNOSIS — N30.00 ACUTE CYSTITIS WITHOUT HEMATURIA: Primary | ICD-10-CM

## 2018-12-03 LAB
BACTERIA: ABNORMAL /HPF
BILIRUBIN URINE: NEGATIVE
BLOOD, URINE: ABNORMAL
CASTS 2: ABNORMAL /LPF
CASTS UA: ABNORMAL /LPF
CHARACTER, URINE: ABNORMAL
COLOR: YELLOW
CRYSTALS, UA: ABNORMAL
EPITHELIAL CELLS, UA: ABNORMAL /HPF
GLUCOSE URINE: NEGATIVE MG/DL
KETONES, URINE: NEGATIVE
LEUKOCYTE ESTERASE, URINE: ABNORMAL
MISCELLANEOUS 2: ABNORMAL
NITRITE, URINE: NEGATIVE
PH UA: 5.5
PROTEIN UA: NEGATIVE
RBC URINE: ABNORMAL /HPF
RENAL EPITHELIAL, UA: ABNORMAL
SPECIFIC GRAVITY, URINE: 1.01 (ref 1–1.03)
UROBILINOGEN, URINE: 0.2 EU/DL
WBC UA: > 100 /HPF
YEAST: ABNORMAL

## 2018-12-03 PROCEDURE — 99283 EMERGENCY DEPT VISIT LOW MDM: CPT

## 2018-12-03 PROCEDURE — 81001 URINALYSIS AUTO W/SCOPE: CPT

## 2018-12-03 PROCEDURE — 87086 URINE CULTURE/COLONY COUNT: CPT

## 2018-12-03 PROCEDURE — 87186 SC STD MICRODIL/AGAR DIL: CPT

## 2018-12-03 PROCEDURE — 87077 CULTURE AEROBIC IDENTIFY: CPT

## 2018-12-03 PROCEDURE — 87184 SC STD DISK METHOD PER PLATE: CPT

## 2018-12-03 RX ORDER — CEFTRIAXONE 1 G/1
1 INJECTION, POWDER, FOR SOLUTION INTRAMUSCULAR; INTRAVENOUS ONCE
Status: DISCONTINUED | OUTPATIENT
Start: 2018-12-03 | End: 2018-12-03 | Stop reason: HOSPADM

## 2018-12-03 RX ORDER — LIDOCAINE HYDROCHLORIDE 10 MG/ML
INJECTION, SOLUTION EPIDURAL; INFILTRATION; INTRACAUDAL; PERINEURAL
Status: DISCONTINUED
Start: 2018-12-03 | End: 2018-12-03 | Stop reason: HOSPADM

## 2018-12-03 RX ORDER — CIPROFLOXACIN 500 MG/1
500 TABLET, FILM COATED ORAL 2 TIMES DAILY
Qty: 20 TABLET | Refills: 0 | Status: SHIPPED | OUTPATIENT
Start: 2018-12-03 | End: 2018-12-13

## 2018-12-03 RX ORDER — FLAVOXATE HYDROCHLORIDE 100 MG/1
100 TABLET ORAL 3 TIMES DAILY PRN
Qty: 15 TABLET | Refills: 0 | Status: SHIPPED | OUTPATIENT
Start: 2018-12-03 | End: 2019-07-19 | Stop reason: ALTCHOICE

## 2018-12-03 ASSESSMENT — ENCOUNTER SYMPTOMS
EYE PAIN: 0
CONSTIPATION: 0
SORE THROAT: 0
ABDOMINAL PAIN: 0
PHOTOPHOBIA: 0
ABDOMINAL DISTENTION: 0
SHORTNESS OF BREATH: 0
CHEST TIGHTNESS: 0
COUGH: 0

## 2018-12-03 ASSESSMENT — PAIN SCALES - GENERAL: PAINLEVEL_OUTOF10: 7

## 2018-12-03 NOTE — ED PROVIDER NOTES
Pain.       ALLERGIES     is allergic to sulfa antibiotics. FAMILY HISTORY     indicated that her mother is alive. She indicated that her father is alive. She indicated that her sister is alive. She indicated that both of her brothers are alive. She indicated that her maternal grandmother is . She indicated that her maternal grandfather is . She indicated that her paternal grandmother is . She indicated that her paternal grandfather is . family history includes Diabetes in her paternal grandfather; Diabetes (age of onset: 72) in her father; Heart Disease (age of onset: 48) in her father; Lung Cancer (age of onset: [de-identified]) in her father; Mult Sclerosis (age of onset: 27) in her mother. SOCIAL HISTORY      reports that she has never smoked. She has never used smokeless tobacco. She reports that she drinks about 3.0 oz of alcohol per week . She reports that she does not use drugs. PHYSICAL EXAM     INITIAL VITALS:  oral temperature is 98 °F (36.7 °C). Her blood pressure is 134/88 and her pulse is 88. Her respiration is 16 and oxygen saturation is 96%. Physical Exam   Constitutional: She is oriented to person, place, and time. She appears well-developed and well-nourished. Non-toxic appearance. HENT:   Head: Normocephalic and atraumatic. Right Ear: Tympanic membrane and external ear normal.   Left Ear: Tympanic membrane and external ear normal.   Nose: Nose normal.   Mouth/Throat: Oropharynx is clear and moist and mucous membranes are normal. No oropharyngeal exudate, posterior oropharyngeal edema or posterior oropharyngeal erythema. Eyes: Conjunctivae and EOM are normal.   Neck: Normal range of motion. Neck supple. No JVD present. Cardiovascular: Normal rate, regular rhythm, normal heart sounds, intact distal pulses and normal pulses. Exam reveals no gallop and no friction rub. No murmur heard.   Pulmonary/Chest: Effort normal and breath sounds normal. No

## 2018-12-05 LAB
ORGANISM: ABNORMAL
URINE CULTURE REFLEX: ABNORMAL

## 2018-12-27 ENCOUNTER — HOSPITAL ENCOUNTER (OUTPATIENT)
Dept: WOMENS IMAGING | Age: 48
Discharge: HOME | End: 2018-12-27
Payer: COMMERCIAL

## 2018-12-27 DIAGNOSIS — Z12.31 VISIT FOR SCREENING MAMMOGRAM: ICD-10-CM

## 2018-12-27 PROCEDURE — 77063 BREAST TOMOSYNTHESIS BI: CPT

## 2019-01-03 ENCOUNTER — HOSPITAL ENCOUNTER (OUTPATIENT)
Age: 49
Discharge: HOME | End: 2019-01-03
Payer: COMMERCIAL

## 2019-01-03 ENCOUNTER — OFFICE VISIT (OUTPATIENT)
Dept: FAMILY MEDICINE CLINIC | Age: 49
End: 2019-01-03
Payer: COMMERCIAL

## 2019-01-03 VITALS
HEART RATE: 75 BPM | BODY MASS INDEX: 26.05 KG/M2 | RESPIRATION RATE: 14 BRPM | HEIGHT: 63 IN | DIASTOLIC BLOOD PRESSURE: 83 MMHG | WEIGHT: 147 LBS | SYSTOLIC BLOOD PRESSURE: 128 MMHG | TEMPERATURE: 98.1 F

## 2019-01-03 DIAGNOSIS — R21 GENERALIZED MACULOPAPULAR RASH: ICD-10-CM

## 2019-01-03 DIAGNOSIS — M25.50 ARTHRALGIA, UNSPECIFIED JOINT: ICD-10-CM

## 2019-01-03 DIAGNOSIS — R21 GENERALIZED MACULOPAPULAR RASH: Primary | ICD-10-CM

## 2019-01-03 DIAGNOSIS — Z13.220 SCREENING CHOLESTEROL LEVEL: ICD-10-CM

## 2019-01-03 LAB
ALBUMIN SERPL-MCNC: 4.5 G/DL (ref 3.5–5.1)
ALP BLD-CCNC: 59 U/L (ref 38–126)
ALT SERPL-CCNC: 12 U/L (ref 11–66)
ANION GAP SERPL CALCULATED.3IONS-SCNC: 14 MEQ/L (ref 8–16)
AST SERPL-CCNC: 15 U/L (ref 5–40)
BACTERIA: ABNORMAL
BASOPHILS # BLD: 0.5 %
BASOPHILS ABSOLUTE: 0 THOU/MM3 (ref 0–0.1)
BILIRUB SERPL-MCNC: 1.4 MG/DL (ref 0.3–1.2)
BILIRUBIN URINE: NEGATIVE
BLOOD, URINE: NEGATIVE
BUN BLDV-MCNC: 15 MG/DL (ref 7–22)
C-REACTIVE PROTEIN: 0.31 MG/DL (ref 0–1)
CALCIUM SERPL-MCNC: 9.4 MG/DL (ref 8.5–10.5)
CASTS: ABNORMAL /LPF
CASTS: ABNORMAL /LPF
CHARACTER, URINE: ABNORMAL
CHLORIDE BLD-SCNC: 102 MEQ/L (ref 98–111)
CHOLESTEROL, TOTAL: 192 MG/DL (ref 100–199)
CO2: 25 MEQ/L (ref 23–33)
COLOR: YELLOW
CREAT SERPL-MCNC: 0.7 MG/DL (ref 0.4–1.2)
CRYSTALS: ABNORMAL
EOSINOPHIL # BLD: 1.4 %
EOSINOPHILS ABSOLUTE: 0.1 THOU/MM3 (ref 0–0.4)
EPITHELIAL CELLS, UA: ABNORMAL /HPF
ERYTHROCYTE [DISTWIDTH] IN BLOOD BY AUTOMATED COUNT: 12.4 % (ref 11.5–14.5)
ERYTHROCYTE [DISTWIDTH] IN BLOOD BY AUTOMATED COUNT: 41.3 FL (ref 35–45)
GFR SERPL CREATININE-BSD FRML MDRD: 89 ML/MIN/1.73M2
GLUCOSE BLD-MCNC: 102 MG/DL (ref 70–108)
GLUCOSE, URINE: NEGATIVE MG/DL
HCT VFR BLD CALC: 42.7 % (ref 37–47)
HDLC SERPL-MCNC: 78 MG/DL
HEMOGLOBIN: 14.3 GM/DL (ref 12–16)
IMMATURE GRANS (ABS): 0.03 THOU/MM3 (ref 0–0.07)
IMMATURE GRANULOCYTES: 0.5 %
KETONES, URINE: 40
LDL CHOLESTEROL CALCULATED: 96 MG/DL
LEUKOCYTE ESTERASE, URINE: NEGATIVE
LYMPHOCYTES # BLD: 14.5 %
LYMPHOCYTES ABSOLUTE: 0.9 THOU/MM3 (ref 1–4.8)
MCH RBC QN AUTO: 31 PG (ref 26–33)
MCHC RBC AUTO-ENTMCNC: 33.5 GM/DL (ref 32.2–35.5)
MCV RBC AUTO: 92.6 FL (ref 81–99)
MISCELLANEOUS LAB TEST RESULT: ABNORMAL
MONOCYTES # BLD: 6.6 %
MONOCYTES ABSOLUTE: 0.4 THOU/MM3 (ref 0.4–1.3)
NITRITE, URINE: NEGATIVE
NUCLEATED RED BLOOD CELLS: 0 /100 WBC
PH UA: 5.5
PLATELET # BLD: 222 THOU/MM3 (ref 130–400)
PMV BLD AUTO: 9.5 FL (ref 9.4–12.4)
POTASSIUM SERPL-SCNC: 4.2 MEQ/L (ref 3.5–5.2)
PROTEIN UA: NEGATIVE MG/DL
RBC # BLD: 4.61 MILL/MM3 (ref 4.2–5.4)
RBC URINE: ABNORMAL /HPF
RENAL EPITHELIAL, UA: ABNORMAL
RHEUMATOID FACTOR: < 10 IU/ML (ref 0–13)
SEDIMENTATION RATE, ERYTHROCYTE: 13 MM/HR (ref 0–20)
SEG NEUTROPHILS: 76.5 %
SEGMENTED NEUTROPHILS ABSOLUTE COUNT: 4.9 THOU/MM3 (ref 1.8–7.7)
SODIUM BLD-SCNC: 141 MEQ/L (ref 135–145)
SPECIFIC GRAVITY UA: 1.02 (ref 1–1.03)
TOTAL PROTEIN: 7.6 G/DL (ref 6.1–8)
TRIGL SERPL-MCNC: 92 MG/DL (ref 0–199)
TSH SERPL DL<=0.05 MIU/L-ACNC: 0.78 UIU/ML (ref 0.4–4.2)
URIC ACID: 4.8 MG/DL (ref 2.4–5.7)
UROBILINOGEN, URINE: 0.2 EU/DL
VITAMIN D 25-HYDROXY: 32 NG/ML (ref 30–100)
WBC # BLD: 6.4 THOU/MM3 (ref 4.8–10.8)
WBC UA: ABNORMAL /HPF
YEAST: ABNORMAL

## 2019-01-03 PROCEDURE — 84550 ASSAY OF BLOOD/URIC ACID: CPT

## 2019-01-03 PROCEDURE — 81001 URINALYSIS AUTO W/SCOPE: CPT

## 2019-01-03 PROCEDURE — 82306 VITAMIN D 25 HYDROXY: CPT

## 2019-01-03 PROCEDURE — 80053 COMPREHEN METABOLIC PANEL: CPT

## 2019-01-03 PROCEDURE — 86140 C-REACTIVE PROTEIN: CPT

## 2019-01-03 PROCEDURE — 86038 ANTINUCLEAR ANTIBODIES: CPT

## 2019-01-03 PROCEDURE — 96372 THER/PROPH/DIAG INJ SC/IM: CPT | Performed by: FAMILY MEDICINE

## 2019-01-03 PROCEDURE — 80061 LIPID PANEL: CPT

## 2019-01-03 PROCEDURE — 36415 COLL VENOUS BLD VENIPUNCTURE: CPT

## 2019-01-03 PROCEDURE — 99213 OFFICE O/P EST LOW 20 MIN: CPT | Performed by: FAMILY MEDICINE

## 2019-01-03 PROCEDURE — 85651 RBC SED RATE NONAUTOMATED: CPT

## 2019-01-03 PROCEDURE — G8484 FLU IMMUNIZE NO ADMIN: HCPCS | Performed by: FAMILY MEDICINE

## 2019-01-03 PROCEDURE — 85025 COMPLETE CBC W/AUTO DIFF WBC: CPT

## 2019-01-03 PROCEDURE — G8419 CALC BMI OUT NRM PARAM NOF/U: HCPCS | Performed by: FAMILY MEDICINE

## 2019-01-03 PROCEDURE — 84443 ASSAY THYROID STIM HORMONE: CPT

## 2019-01-03 PROCEDURE — 86430 RHEUMATOID FACTOR TEST QUAL: CPT

## 2019-01-03 PROCEDURE — 1036F TOBACCO NON-USER: CPT | Performed by: FAMILY MEDICINE

## 2019-01-03 PROCEDURE — G8427 DOCREV CUR MEDS BY ELIG CLIN: HCPCS | Performed by: FAMILY MEDICINE

## 2019-01-03 PROCEDURE — 86200 CCP ANTIBODY: CPT

## 2019-01-03 RX ORDER — METHYLPREDNISOLONE ACETATE 40 MG/ML
40 INJECTION, SUSPENSION INTRA-ARTICULAR; INTRALESIONAL; INTRAMUSCULAR; SOFT TISSUE ONCE
Status: COMPLETED | OUTPATIENT
Start: 2019-01-03 | End: 2019-01-03

## 2019-01-03 RX ORDER — CETIRIZINE HYDROCHLORIDE 10 MG/1
10 TABLET ORAL DAILY
Qty: 60 TABLET | Refills: 0 | Status: SHIPPED | OUTPATIENT
Start: 2019-01-03 | End: 2019-01-18 | Stop reason: CLARIF

## 2019-01-03 RX ORDER — BETAMETHASONE SODIUM PHOSPHATE AND BETAMETHASONE ACETATE 3; 3 MG/ML; MG/ML
6 INJECTION, SUSPENSION INTRA-ARTICULAR; INTRALESIONAL; INTRAMUSCULAR; SOFT TISSUE ONCE
Status: COMPLETED | OUTPATIENT
Start: 2019-01-03 | End: 2019-01-03

## 2019-01-03 RX ADMIN — BETAMETHASONE SODIUM PHOSPHATE AND BETAMETHASONE ACETATE 6 MG: 3; 3 INJECTION, SUSPENSION INTRA-ARTICULAR; INTRALESIONAL; INTRAMUSCULAR; SOFT TISSUE at 12:45

## 2019-01-03 RX ADMIN — METHYLPREDNISOLONE ACETATE 40 MG: 40 INJECTION, SUSPENSION INTRA-ARTICULAR; INTRALESIONAL; INTRAMUSCULAR; SOFT TISSUE at 12:46

## 2019-01-03 ASSESSMENT — PATIENT HEALTH QUESTIONNAIRE - PHQ9
SUM OF ALL RESPONSES TO PHQ QUESTIONS 1-9: 0
SUM OF ALL RESPONSES TO PHQ QUESTIONS 1-9: 0
2. FEELING DOWN, DEPRESSED OR HOPELESS: 0
SUM OF ALL RESPONSES TO PHQ9 QUESTIONS 1 & 2: 0
1. LITTLE INTEREST OR PLEASURE IN DOING THINGS: 0

## 2019-01-04 LAB — ANA SCREEN: NORMAL

## 2019-01-05 LAB — CYCLIC CITRULLIN PEPTIDE AB: 23 UNITS (ref 0–19)

## 2019-01-07 ENCOUNTER — TELEPHONE (OUTPATIENT)
Dept: FAMILY MEDICINE CLINIC | Age: 49
End: 2019-01-07

## 2019-01-08 ENCOUNTER — TELEPHONE (OUTPATIENT)
Dept: FAMILY MEDICINE CLINIC | Age: 49
End: 2019-01-08

## 2019-01-10 ENCOUNTER — TELEPHONE (OUTPATIENT)
Dept: FAMILY MEDICINE CLINIC | Age: 49
End: 2019-01-10

## 2019-01-10 RX ORDER — PREDNISONE 20 MG/1
TABLET ORAL
Qty: 10 TABLET | Refills: 0 | Status: SHIPPED | OUTPATIENT
Start: 2019-01-10 | End: 2019-07-19 | Stop reason: ALTCHOICE

## 2019-01-18 ENCOUNTER — OFFICE VISIT (OUTPATIENT)
Dept: FAMILY MEDICINE CLINIC | Age: 49
End: 2019-01-18
Payer: COMMERCIAL

## 2019-01-18 VITALS
HEIGHT: 63 IN | SYSTOLIC BLOOD PRESSURE: 117 MMHG | TEMPERATURE: 97.5 F | WEIGHT: 140 LBS | OXYGEN SATURATION: 99 % | DIASTOLIC BLOOD PRESSURE: 70 MMHG | BODY MASS INDEX: 24.8 KG/M2 | HEART RATE: 73 BPM

## 2019-01-18 DIAGNOSIS — Z00.00 WELL ADULT EXAM: Primary | ICD-10-CM

## 2019-01-18 PROCEDURE — 90471 IMMUNIZATION ADMIN: CPT | Performed by: FAMILY MEDICINE

## 2019-01-18 PROCEDURE — G8482 FLU IMMUNIZE ORDER/ADMIN: HCPCS | Performed by: FAMILY MEDICINE

## 2019-01-18 PROCEDURE — 99396 PREV VISIT EST AGE 40-64: CPT | Performed by: FAMILY MEDICINE

## 2019-01-18 PROCEDURE — 90688 IIV4 VACCINE SPLT 0.5 ML IM: CPT | Performed by: FAMILY MEDICINE

## 2019-01-18 ASSESSMENT — ENCOUNTER SYMPTOMS
RHINORRHEA: 0
SHORTNESS OF BREATH: 0
TROUBLE SWALLOWING: 0
VOMITING: 0
COLOR CHANGE: 0
DIARRHEA: 0
EYE REDNESS: 0
EYE DISCHARGE: 0
FACIAL SWELLING: 0
STRIDOR: 0
NAUSEA: 0
RECTAL PAIN: 0
ABDOMINAL PAIN: 0
SINUS PRESSURE: 0
VOICE CHANGE: 0
BACK PAIN: 0
SORE THROAT: 0
CHOKING: 0
BLOOD IN STOOL: 0
EYE ITCHING: 0
COUGH: 0
APNEA: 0
EYE PAIN: 0
ABDOMINAL DISTENTION: 0
CONSTIPATION: 0
WHEEZING: 0
CHEST TIGHTNESS: 0
PHOTOPHOBIA: 0
ANAL BLEEDING: 0

## 2019-07-19 ENCOUNTER — OFFICE VISIT (OUTPATIENT)
Dept: FAMILY MEDICINE CLINIC | Age: 49
End: 2019-07-19
Payer: COMMERCIAL

## 2019-07-19 ENCOUNTER — HOSPITAL ENCOUNTER (OUTPATIENT)
Age: 49
Discharge: HOME | End: 2019-07-19
Payer: COMMERCIAL

## 2019-07-19 VITALS
HEART RATE: 80 BPM | DIASTOLIC BLOOD PRESSURE: 66 MMHG | OXYGEN SATURATION: 99 % | TEMPERATURE: 97.7 F | HEIGHT: 63 IN | BODY MASS INDEX: 24.1 KG/M2 | SYSTOLIC BLOOD PRESSURE: 103 MMHG | WEIGHT: 136 LBS

## 2019-07-19 DIAGNOSIS — Z11.4 ENCOUNTER FOR SCREENING FOR HIV: ICD-10-CM

## 2019-07-19 DIAGNOSIS — M47.819 UNDIFFERENTIATED SPONDYLOARTHROPATHY: Primary | ICD-10-CM

## 2019-07-19 DIAGNOSIS — M47.819 UNDIFFERENTIATED SPONDYLOARTHROPATHY: ICD-10-CM

## 2019-07-19 DIAGNOSIS — M25.50 ARTHRALGIA, UNSPECIFIED JOINT: ICD-10-CM

## 2019-07-19 PROCEDURE — G8427 DOCREV CUR MEDS BY ELIG CLIN: HCPCS | Performed by: FAMILY MEDICINE

## 2019-07-19 PROCEDURE — G8420 CALC BMI NORM PARAMETERS: HCPCS | Performed by: FAMILY MEDICINE

## 2019-07-19 PROCEDURE — 86200 CCP ANTIBODY: CPT

## 2019-07-19 PROCEDURE — 36415 COLL VENOUS BLD VENIPUNCTURE: CPT

## 2019-07-19 PROCEDURE — 1036F TOBACCO NON-USER: CPT | Performed by: FAMILY MEDICINE

## 2019-07-19 PROCEDURE — 87389 HIV-1 AG W/HIV-1&-2 AB AG IA: CPT

## 2019-07-19 PROCEDURE — 99214 OFFICE O/P EST MOD 30 MIN: CPT | Performed by: FAMILY MEDICINE

## 2019-07-19 PROCEDURE — 86812 HLA TYPING A B OR C: CPT

## 2019-07-19 NOTE — PROGRESS NOTES
[address] [place]  [address] [place], [address]  Ph:   [phone]  Fax: [phone]    Provider:  Dr. Painting     Patient:  Sourav Theodore  YOB: 1970      Visit Date:  7/19/2019     Reason For Visit:   Chief Complaint   Patient presents with    6 Month Follow-Up     discuss labs 01/2019    Fatigue     WAKING UP EVERY NIGHT    Headache     Wakes her up in the middle of the night every night. Adebayo Guardado is a 51 y.o. female who comes today to the office because of a rash. She states that over 10 years she has been having joint pains off and on, associated with swelling and redness of the joints,  more prominent in her fingers. Other affected joints are the hip, knees, lower back, shoulder, ankles, and sometimes her jaw. One of the most prominent pains in her lower back and hips. She also has morming stiffness for several hours in the morning. Her symptoms get better with exercise and worse overnight. When she wakes up she feels \"like a 81year old woman\"   Her mother has RA and MS and she was diagnosed at age 28. In 2015, Dr. Jose Carlos Fields, diagnosed her with spondyloarthritis and placed her on Methotrexate, but she had several side effects and she could not take it. She has lived with the pain since then. She takes Aleve 2 tablets every day. She exercises (weights and cycling) and she sees a  twice a week and that helps to control the pain. I reviewed blood test she had from Dr. Jose Carlos Fields and her CRP and ESR always been normal.  She had a weak positive CCPA test 7 months ago. AMANDEEP and RF has been negative. Significant Past Medical History:  History reviewed. No pertinent past medical history. Allergies:  is allergic to sulfa antibiotics. Medications:   No current outpatient medications on file. No current facility-administered medications for this visit.         Review of systems:  Review of Systems - History obtained from chart review and the patient  General ROS: negative for - chills, fatigue or fever  Psychological ROS: negative for - anxiety, depression or sleep disturbances  ENT ROS: negative for - headaches, nasal congestion or nasal discharge  Allergy and Immunology ROS: negative for - seasonal allergies  Hematological and Lymphatic ROS: negative for - bruising  Endocrine ROS: negative for - malaise/lethargy, polydipsia/polyuria or temperature intolerance  Respiratory ROS: negative for - cough,  shortness of breath or wheezing  Cardiovascular ROS: negative for - chest pain or edema  Gastrointestinal ROS: negative for - abdominal pain, constipation, diarrhea or nausea/vomiting  Musculoskeletal ROS: positive for - joint pain   Neurological ROS: negative for - dizziness  Dermatological ROS: negative for - rash    Physical Examination:  /66 (Site: Left Upper Arm, Position: Sitting, Cuff Size: Medium Adult)   Pulse 80   Temp 97.7 °F (36.5 °C) (Oral)   Ht 5' 3\" (1.6 m)   Wt 136 lb (61.7 kg)   SpO2 99%   BMI 24.09 kg/m²     General-  Alert and oriented x 3, NAD  HEENT: NC, AT, PERRLA, EOMI, anicteric sclerae  Ears: Normal tympanic membranes bilaterally  Nose: patent, no lesions  Mouth: no lesions, moist mucosa, geographic tongue   Neck - supple, no significant adenopathy  Chest - clear to auscultation, no wheezes, rales or rhonchi, symmetric air entry  Heart - normal rate, regular rhythm, normal S1, S2, no murmurs, rubs, clicks or gallops  Abdomen - soft, nontender, nondistended, no masses or organomegaly  Extremities - peripheral pulses normal, no pedal edema, no clubbing or cyanosis  Skin - she has a maculo-papular rash all over her extremities and trunk    Impression:   Diagnosis Orders   1. Undifferentiated spondyloarthropathy  HLA Antigen J29    Cyclic Citrul Peptide Antibody, IgG    [place], [place], Rheumatology, [facility] [address]   2. Arthralgia, unspecified joint     3.  Encounter for screening for HIV  HIV Screen

## 2019-07-20 LAB — HIV-2 AB: NEGATIVE

## 2019-07-21 LAB
CYCLIC CITRULLIN PEPTIDE AB: 5 UNITS (ref 0–19)
HLA-B27: NEGATIVE

## 2019-07-29 ENCOUNTER — TELEPHONE (OUTPATIENT)
Dept: FAMILY MEDICINE CLINIC | Age: 49
End: 2019-07-29

## 2019-07-29 NOTE — TELEPHONE ENCOUNTER
----- Message from Jasbir Olmstead MD sent at 7/26/2019  3:29 PM EDT -----  Blood test within acceptable ranges.   The cyclic citrulline peptide antibody went from 23 (mild elevated) to 5

## 2019-12-04 ENCOUNTER — NURSE ONLY (OUTPATIENT)
Dept: FAMILY MEDICINE CLINIC | Age: 49
End: 2019-12-04
Payer: COMMERCIAL

## 2019-12-04 DIAGNOSIS — Z23 NEED FOR INFLUENZA VACCINATION: Primary | ICD-10-CM

## 2019-12-04 PROCEDURE — 90471 IMMUNIZATION ADMIN: CPT | Performed by: FAMILY MEDICINE

## 2019-12-04 PROCEDURE — 90688 IIV4 VACCINE SPLT 0.5 ML IM: CPT | Performed by: FAMILY MEDICINE

## 2019-12-19 ENCOUNTER — HOSPITAL ENCOUNTER (OUTPATIENT)
Dept: WOMENS IMAGING | Age: 49
Discharge: HOME | End: 2019-12-19
Payer: COMMERCIAL

## 2019-12-19 DIAGNOSIS — Z12.31 VISIT FOR SCREENING MAMMOGRAM: ICD-10-CM

## 2019-12-19 PROCEDURE — 77063 BREAST TOMOSYNTHESIS BI: CPT

## 2019-12-23 ENCOUNTER — OFFICE VISIT (OUTPATIENT)
Dept: FAMILY MEDICINE CLINIC | Age: 49
End: 2019-12-23
Payer: COMMERCIAL

## 2019-12-23 VITALS
HEIGHT: 63 IN | HEART RATE: 76 BPM | TEMPERATURE: 97.9 F | RESPIRATION RATE: 14 BRPM | WEIGHT: 140.8 LBS | BODY MASS INDEX: 24.95 KG/M2 | DIASTOLIC BLOOD PRESSURE: 70 MMHG | SYSTOLIC BLOOD PRESSURE: 104 MMHG

## 2019-12-23 DIAGNOSIS — Z00.00 WELL ADULT EXAM: Primary | ICD-10-CM

## 2019-12-23 DIAGNOSIS — Z13.1 DIABETES MELLITUS SCREENING: ICD-10-CM

## 2019-12-23 DIAGNOSIS — Z13.220 LIPID SCREENING: ICD-10-CM

## 2019-12-23 PROCEDURE — 99396 PREV VISIT EST AGE 40-64: CPT | Performed by: FAMILY MEDICINE

## 2019-12-23 PROCEDURE — G8482 FLU IMMUNIZE ORDER/ADMIN: HCPCS | Performed by: FAMILY MEDICINE

## 2019-12-23 RX ORDER — NITROFURANTOIN 25; 75 MG/1; MG/1
CAPSULE ORAL
Refills: 3 | COMMUNITY
Start: 2019-11-19 | End: 2020-08-25

## 2019-12-23 ASSESSMENT — ENCOUNTER SYMPTOMS
PHOTOPHOBIA: 0
VOICE CHANGE: 0
WHEEZING: 0
COUGH: 0
SORE THROAT: 0
EYE REDNESS: 0
SHORTNESS OF BREATH: 0
BLOOD IN STOOL: 0
RECTAL PAIN: 0
TROUBLE SWALLOWING: 0
CHEST TIGHTNESS: 0
DIARRHEA: 0
STRIDOR: 0
CONSTIPATION: 0
FACIAL SWELLING: 0
EYE DISCHARGE: 0
ABDOMINAL DISTENTION: 0
SINUS PAIN: 0
EYE ITCHING: 0
VOMITING: 0
ABDOMINAL PAIN: 0
COLOR CHANGE: 0
APNEA: 0
EYE PAIN: 0
SINUS PRESSURE: 0
RHINORRHEA: 0
BACK PAIN: 0
CHOKING: 0
NAUSEA: 0
ANAL BLEEDING: 0

## 2020-08-25 ENCOUNTER — HOSPITAL ENCOUNTER (OUTPATIENT)
Dept: GENERAL RADIOLOGY | Age: 50
Discharge: HOME | End: 2020-08-25
Payer: COMMERCIAL

## 2020-08-25 ENCOUNTER — OFFICE VISIT (OUTPATIENT)
Dept: RHEUMATOLOGY | Age: 50
End: 2020-08-25
Payer: COMMERCIAL

## 2020-08-25 ENCOUNTER — HOSPITAL ENCOUNTER (OUTPATIENT)
Age: 50
Discharge: HOME | End: 2020-08-25
Payer: COMMERCIAL

## 2020-08-25 VITALS
HEART RATE: 82 BPM | WEIGHT: 150.8 LBS | DIASTOLIC BLOOD PRESSURE: 62 MMHG | TEMPERATURE: 97.3 F | SYSTOLIC BLOOD PRESSURE: 122 MMHG | OXYGEN SATURATION: 96 % | BODY MASS INDEX: 26.72 KG/M2 | HEIGHT: 63 IN

## 2020-08-25 LAB
ALBUMIN SERPL-MCNC: 4.6 G/DL (ref 3.5–5.1)
ALP BLD-CCNC: 66 U/L (ref 38–126)
ALT SERPL-CCNC: 16 U/L (ref 11–66)
ANION GAP SERPL CALCULATED.3IONS-SCNC: 9 MEQ/L (ref 8–16)
AST SERPL-CCNC: 22 U/L (ref 5–40)
BASOPHILS # BLD: 1 %
BASOPHILS ABSOLUTE: 0.1 THOU/MM3 (ref 0–0.1)
BILIRUB SERPL-MCNC: 1.2 MG/DL (ref 0.3–1.2)
BUN BLDV-MCNC: 15 MG/DL (ref 7–22)
C-REACTIVE PROTEIN: 0.77 MG/DL (ref 0–1)
CALCIUM SERPL-MCNC: 9.2 MG/DL (ref 8.5–10.5)
CHLORIDE BLD-SCNC: 102 MEQ/L (ref 98–111)
CO2: 26 MEQ/L (ref 23–33)
CREAT SERPL-MCNC: 0.6 MG/DL (ref 0.4–1.2)
EOSINOPHIL # BLD: 0.6 %
EOSINOPHILS ABSOLUTE: 0 THOU/MM3 (ref 0–0.4)
ERYTHROCYTE [DISTWIDTH] IN BLOOD BY AUTOMATED COUNT: 12.4 % (ref 11.5–14.5)
ERYTHROCYTE [DISTWIDTH] IN BLOOD BY AUTOMATED COUNT: 44.7 FL (ref 35–45)
GFR SERPL CREATININE-BSD FRML MDRD: > 90 ML/MIN/1.73M2
GLUCOSE BLD-MCNC: 87 MG/DL (ref 70–108)
HAV IGM SER IA-ACNC: NEGATIVE
HCT VFR BLD CALC: 44.2 % (ref 37–47)
HEMOGLOBIN: 14.4 GM/DL (ref 12–16)
HEPATITIS B CORE IGM ANTIBODY: NEGATIVE
HEPATITIS B SURFACE ANTIGEN: NEGATIVE
HEPATITIS C ANTIBODY: NEGATIVE
IMMATURE GRANS (ABS): 0.02 THOU/MM3 (ref 0–0.07)
IMMATURE GRANULOCYTES: 0.4 %
LYMPHOCYTES # BLD: 18.6 %
LYMPHOCYTES ABSOLUTE: 1 THOU/MM3 (ref 1–4.8)
MCH RBC QN AUTO: 32 PG (ref 26–33)
MCHC RBC AUTO-ENTMCNC: 32.6 GM/DL (ref 32.2–35.5)
MCV RBC AUTO: 98.2 FL (ref 81–99)
MONOCYTES # BLD: 7.5 %
MONOCYTES ABSOLUTE: 0.4 THOU/MM3 (ref 0.4–1.3)
NUCLEATED RED BLOOD CELLS: 0 /100 WBC
PLATELET # BLD: 229 THOU/MM3 (ref 130–400)
PMV BLD AUTO: 9.4 FL (ref 9.4–12.4)
POTASSIUM SERPL-SCNC: 4 MEQ/L (ref 3.5–5.2)
RBC # BLD: 4.5 MILL/MM3 (ref 4.2–5.4)
SEDIMENTATION RATE, ERYTHROCYTE: 10 MM/HR (ref 0–20)
SEG NEUTROPHILS: 71.9 %
SEGMENTED NEUTROPHILS ABSOLUTE COUNT: 3.7 THOU/MM3 (ref 1.8–7.7)
SODIUM BLD-SCNC: 137 MEQ/L (ref 135–145)
TOTAL PROTEIN: 7.3 G/DL (ref 6.1–8)
WBC # BLD: 5.2 THOU/MM3 (ref 4.8–10.8)

## 2020-08-25 PROCEDURE — 36415 COLL VENOUS BLD VENIPUNCTURE: CPT

## 2020-08-25 PROCEDURE — 86140 C-REACTIVE PROTEIN: CPT

## 2020-08-25 PROCEDURE — 80074 ACUTE HEPATITIS PANEL: CPT

## 2020-08-25 PROCEDURE — 85651 RBC SED RATE NONAUTOMATED: CPT

## 2020-08-25 PROCEDURE — 86235 NUCLEAR ANTIGEN ANTIBODY: CPT

## 2020-08-25 PROCEDURE — 85025 COMPLETE CBC W/AUTO DIFF WBC: CPT

## 2020-08-25 PROCEDURE — 86480 TB TEST CELL IMMUN MEASURE: CPT

## 2020-08-25 PROCEDURE — G8427 DOCREV CUR MEDS BY ELIG CLIN: HCPCS | Performed by: NURSE PRACTITIONER

## 2020-08-25 PROCEDURE — G8419 CALC BMI OUT NRM PARAM NOF/U: HCPCS | Performed by: NURSE PRACTITIONER

## 2020-08-25 PROCEDURE — 80053 COMPREHEN METABOLIC PANEL: CPT

## 2020-08-25 PROCEDURE — 72100 X-RAY EXAM L-S SPINE 2/3 VWS: CPT

## 2020-08-25 PROCEDURE — 73130 X-RAY EXAM OF HAND: CPT

## 2020-08-25 PROCEDURE — 99204 OFFICE O/P NEW MOD 45 MIN: CPT | Performed by: NURSE PRACTITIONER

## 2020-08-25 PROCEDURE — 71046 X-RAY EXAM CHEST 2 VIEWS: CPT

## 2020-08-25 RX ORDER — PREDNISONE 10 MG/1
TABLET ORAL
Qty: 15 TABLET | Refills: 0 | Status: CANCELLED | OUTPATIENT
Start: 2020-08-25 | End: 2020-09-04

## 2020-08-25 RX ORDER — PREDNISONE 10 MG/1
TABLET ORAL
Qty: 30 TABLET | Refills: 0 | Status: SHIPPED | OUTPATIENT
Start: 2020-08-25 | End: 2020-09-09

## 2020-08-25 ASSESSMENT — ENCOUNTER SYMPTOMS
ABDOMINAL PAIN: 0
CONSTIPATION: 0
DIARRHEA: 0
NAUSEA: 0
SHORTNESS OF BREATH: 0
EYE ITCHING: 0
EYE PAIN: 0
COUGH: 0
TROUBLE SWALLOWING: 0
BACK PAIN: 1

## 2020-08-25 NOTE — PROGRESS NOTES
[facility]       Date Of Service: 8/25/2020  Provider: Farhat Alex CNP    Name: Cedric Peralta   MRN: [id number]    Chief Complaint(s)      Chief Complaint   Patient presents with    New Patient     referral from Dr Kristen Berkowitz for Undifferentiated Spondyloarthropy         History of Present illness (HPI)    Cedric Peralta is a(n)50 y.o. female with a hx of undifferentiated spondyloarthropathy referred by Yayo Dalton MD for evaluation of undifferentiated spondyloarthropathy      Symptoms started: Over 10 years ago with joint pains on and off with swelling and redness of the joints. Pain in heels, fingers, hips, knees low back, shoulders, ankles, and her jaw. Saw Dr. Isabella Hooper in 2015, diagnosed with undifferentiated spondyloarthropathy. Was started on methotrexate, had side effects of drowsiness, fatigue, leukopenia. Was started on enbrel with some injection site reaction and leukopenia, did get relief of the joint pains. Dr. Isabella Hooper wanted to start [place], however insurance did not cover and patient was lost to follow up. Progression of joint pains since. + history of fusiform swelling of right 4th finger, saw EDUARDO, tested for gout at that time.      The patient reports pain affecting her  hands, hips, right knee, ankles, toes, neck, back   Pain on a scale 0-10: 3/10  Type of pain: constant in ankles  Timing: mornings   Aggravating factors: hands: certain foods, hips/knees: sitting for long periods, back: sitting for long periods   Alleviating factors: movement    Current therapy: aleve (some relief)  Previous therapy:methotrexate (fatigue, brain fog), enbrel (leukopenia)    Associated symptoms:  + swelling/ redness/  Warmth (hands)  + AM stiffness lasting 30-45 minutes  + gelling    Rash on tops of feet  + dry eyes    -denies Photosenstivity, dry mouth/dry eyes, oral/nasal sores, Raynaud's, digital ulcerations, skin tightening, renal disease,foamy urination, hematuria, sz's, blood clots, pre-term labor loss, AIHA,leukpenia/lymphopenia, thrombocytopenia, hair loss, serositis    - denies enthesitis, dactylitis, nail changes, hx of STD,  personal or family history of Psoriatic arthritis, psoriasis, ank spond,     +fmhx of rheumatoid arthritis in mother      Cancer screening: UTD  Travel: n/a  Exposure(s): n/a    Review of Systems    Review of Systems   Constitutional: Negative for fatigue, fever and unexpected weight change. HENT: Negative for congestion, drooling and trouble swallowing. Eyes: Negative for pain and itching. Dry eyes   Respiratory: Negative for cough and shortness of breath. Cardiovascular: Negative for chest pain and leg swelling. Gastrointestinal: Negative for abdominal pain, constipation, diarrhea and nausea. Endocrine: Negative for cold intolerance and heat intolerance. Genitourinary: Negative for difficulty urinating, frequency and urgency. Musculoskeletal: Positive for arthralgias, back pain and joint swelling. Skin: Negative for rash. Neurological: Negative for dizziness, weakness, numbness and headaches. Psychiatric/Behavioral: The patient is not nervous/anxious. PAST MEDICAL HISTORY     has no past medical history on file. PAST SURGICAL HISTORY     has a past surgical history that includes Endometrial ablation (2006) and Breast surgery (2009). FAMILY HISTORY      Family History   Problem Relation Age of Onset    Mult Sclerosis Mother 27    Diabetes Father 72    Heart Disease Father 48        MI at 48    Lung Cancer Father [de-identified]    Diabetes Paternal Grandfather        SOCIAL HISTORY     reports that she has never smoked. She has never used smokeless tobacco. She reports current alcohol use of about 5.0 standard drinks of alcohol per week. She reports that she does not use drugs.       ALLERGIES     Allergies   Allergen Reactions    Sulfa Antibiotics Anaphylaxis       CURRENT MEDICATIONS      Current Outpatient Medications:   predniSONE (DELTASONE) 10 MG tablet, Take 3 tablets by mouth daily for 5 days, THEN 2 tablets daily for 5 days, THEN 1 tablet daily for 5 days. , Disp: 30 tablet, Rfl: 0    PHYSICAL EXAMINATION / OBJECTIVE     Objective:  /62 (Site: Left Upper Arm, Position: Sitting, Cuff Size: Medium Adult)   Pulse 82   Temp 97.3 °F (36.3 °C)   Ht 5' 2.99\" (1.6 m)   Wt 150 lb 12.8 oz (68.4 kg)   SpO2 96%   BMI 26.72 kg/m²     General Appearance:   alert and oriented to person, place and time well-developed and well nourished  Physch : appropriate affects ,   Head:  Normocephalic and atraumatic  Eyes: No gross abnormalities. ,  PERRL, EOMI, Sclera nonicteric, conjunctiva non-icteric. ENT:  MMM,  no deformities , NO oral/nasal sores, Non-tender sinuses. Neck:  Neck supple, Non-tender, No  mass, thyromegaly,    Lymph:  No cervical  or  supraclavicular lymph node swelling. Pulmonary/Chest:  CTA bilat. , normal air movement, no respiratory distress  Cardiovascular:  Normal rate, + S1 and S2,  NO murmurs , rubs, gallups,  no edema  :  Deferred   Abd/GI: Deferred   Neurologic:  gait and coordination normal and speech normal  Skin:  Skin color and temp ,  No rashes or lesions  Extremities:  No clubbing ,     Musculoskeletal:  normal ROM ,  Strength 5/5     Upper extremities:    SHOULDERS nontender, no swelling,  ,   ELBOWS nontender, no swelling,   WRISTS nontender, no swelling, ,   HANDS/FINGERS  MCPs + tender right 4, no swelling   PIPs + tender right 2-4, left 3-5, + bogginess left 3,4   DIPs + tender bilat    Lower extremities:  HIPS + tender bilat outer hips,   KNEES nontender, no swelling  ANKLES + tender bilat, no swelling   FEET : + midfoot tender bilat, + MTP compression right, right posterior heel tenderness and warmth, + fullness right 1st MTP    Spine:   C-spine, T-spine & L-spine:  + tender lumbar spine,  ROM normal ,  - shober, + stephanie, - Occiput to wall , SLR/Cross SLR.      LABS        CBC  Lab Results   Component Value Date    WBC 6.4 01/03/2019    RBC 4.61 01/03/2019    RBC 4.34 04/23/2012    HGB 14.3 01/03/2019    HCT 42.7 01/03/2019    MCV 92.6 01/03/2019    MCH 31.0 01/03/2019    MCHC 33.5 01/03/2019    RDW 13.3 12/19/2017     01/03/2019       CMP  Lab Results   Component Value Date    CALCIUM 9.4 01/03/2019    LABALBU 4.5 01/03/2019    PROT 7.6 01/03/2019     01/03/2019    K 4.2 01/03/2019    CO2 25 01/03/2019     01/03/2019    BUN 15 01/03/2019    CREATININE 0.7 01/03/2019    ALKPHOS 59 01/03/2019    ALT 12 01/03/2019    AST 15 01/03/2019       HgBA1c: No components found for: HGBA1C    Lab Results   Component Value Date    TSH 0.783 01/03/2019     Lab Results   Component Value Date    VITD25 32 01/03/2019         Lab Results   Component Value Date    ANASCRN None Detected 01/03/2019     No results found for: SSA  No results found for: SSB  No results found for: ANTI-SMITH  No results found for: DSDNAAB   No results found for: ANTIRNP  No results found for: C3, C4  Lab Results   Component Value Date    CCPAB 5 07/19/2019     Lab Results   Component Value Date    RF < 10 01/03/2019       No components found for: CANCASCRN, APANCASCRN  Lab Results   Component Value Date    SEDRATE 13 01/03/2019     Lab Results   Component Value Date    CRP 0.31 01/03/2019       RADIOLOGY:         ASSESSMENT/PLAN    Assessment   Plan      Polyarthralgia  Chronic bilateral low back pain without sciatica  History of spondyloarthropathy  - joint pains starting 10 years ago, diagnosed with undifferentiated spondyloarthropathy by Dr. Alee Joyner around 2016. Was started on methotrexate which made her fatigued and was stopped. Enbrel was started with sig relief of joint pains, however has persistent leukopenia. Patient was lost to follow up. + inflammatory back pain, hx of dactylitis (per [facility] records), + enthesitis. RF CCP negative.    - Reviewed records from Dr. Alee Joyner and Joni  - Reviewed prior imaging  - Negative rheumatoid factor, AMANDEEP, HLA-B27, CCP 23, then negative on repeat. ESR/CRP normal  - Checking XRay lumbar spine for potential back involvement. ? DMARD vs biologic (alt anti-TNF vs IL17) depending on results  - prednisone taper provided to patient. - XR LUMBAR SPINE (2-3 VIEWS); Future  - XR HAND LEFT (MIN 3 VIEWS); Future  - XR HAND RIGHT (MIN 3 VIEWS); Future  - XR CHEST (2 VW); Future  - Anti SSB  - Anti SSA  - Comprehensive Metabolic Panel  - C-Reactive Protein  - Hepatitis Panel, Acute  - CBC Auto Differential  - Sedimentation Rate  - Quantiferon TB Gold    Dry eyes  - SSA and SSB      No follow-ups on file. Electronically signed by VIVIANA Hurtado CNP on 8/25/2020 at 1:55 PM    New Prescriptions    PREDNISONE (DELTASONE) 10 MG TABLET    Take 3 tablets by mouth daily for 5 days, THEN 2 tablets daily for 5 days, THEN 1 tablet daily for 5 days. The risks and benefits of my recommendations, as well as other treatment options, benefits and side effects were discussed with the patient today. Questions were answered. Thank you for allowing me to participate in the care of this patient. Please call if there are any questions.

## 2020-08-28 LAB
ANTI SSA: NORMAL
ANTI SSB: 1 AU/ML (ref 0–40)

## 2020-08-29 LAB
QUANTI TB GOLD PLUS: NEGATIVE
QUANTI TB1 MINUS NIL: 0.06 IU/ML (ref 0–0.34)
QUANTI TB2 MINUS NIL: 0.06 IU/ML (ref 0–0.34)
QUANTIFERON MITOGEN MINUS NIL: > 10 IU/ML
QUANTIFERON NIL: 0.02 IU/ML

## 2020-10-06 ENCOUNTER — OFFICE VISIT (OUTPATIENT)
Dept: RHEUMATOLOGY | Age: 50
End: 2020-10-06
Payer: COMMERCIAL

## 2020-10-06 VITALS
BODY MASS INDEX: 26.51 KG/M2 | TEMPERATURE: 97.6 F | HEIGHT: 63 IN | DIASTOLIC BLOOD PRESSURE: 58 MMHG | SYSTOLIC BLOOD PRESSURE: 108 MMHG | OXYGEN SATURATION: 98 % | HEART RATE: 82 BPM | WEIGHT: 149.6 LBS

## 2020-10-06 PROCEDURE — G8419 CALC BMI OUT NRM PARAM NOF/U: HCPCS | Performed by: NURSE PRACTITIONER

## 2020-10-06 PROCEDURE — 99214 OFFICE O/P EST MOD 30 MIN: CPT | Performed by: NURSE PRACTITIONER

## 2020-10-06 PROCEDURE — G8427 DOCREV CUR MEDS BY ELIG CLIN: HCPCS | Performed by: NURSE PRACTITIONER

## 2020-10-06 PROCEDURE — 1036F TOBACCO NON-USER: CPT | Performed by: NURSE PRACTITIONER

## 2020-10-06 PROCEDURE — G8484 FLU IMMUNIZE NO ADMIN: HCPCS | Performed by: NURSE PRACTITIONER

## 2020-10-06 RX ORDER — LEFLUNOMIDE 10 MG/1
10 TABLET ORAL DAILY
Qty: 30 TABLET | Refills: 0 | Status: SHIPPED | OUTPATIENT
Start: 2020-10-06 | End: 2020-10-29 | Stop reason: SDUPTHER

## 2020-10-06 ASSESSMENT — ENCOUNTER SYMPTOMS
SHORTNESS OF BREATH: 0
EYE PAIN: 0
DIARRHEA: 0
CONSTIPATION: 0
TROUBLE SWALLOWING: 0
COUGH: 0
NAUSEA: 0
BACK PAIN: 1
ABDOMINAL PAIN: 0
EYE ITCHING: 0

## 2020-10-06 NOTE — PROGRESS NOTES
[facility] RHEUMATOLOGY FOLLOW UP NOTE       Date Of Service: 10/6/2020  Provider: VIVIANA Valentine - CNP    Name: Scherry Galeazzi   MRN: [id number]    Chief Complaint(s)     Chief Complaint   Patient presents with    Follow-up     6 week  polyarthralgia        History of Present Illness (HPI)     Scherry Galeazzi  is a(n)50 y.o. female with a hx of undifferentiated spondyloarthropathy here for the f/u evaluation of undifferentiated spondyloarthropathy     Interval hx:    - great relief with prednisone taper    pain affecting the bilateral hands, hips, knees, ankles, back  Pain on a scale 0-10: 3.5/10  Type of pain: constant in ankles  Timing: mornings   Aggravating factors: hands: certain foods, hips/knees: sitting for long periods, back: sitting for long periods   Alleviating factors: movement      Associated symptoms:  denies swelling/  Redness/ warmth, + AM stiffness lasting ~ 30 minutes    REVIEW OF SYSTEMS: (ROS)    Review of Systems   Constitutional: Negative for fatigue, fever and unexpected weight change. HENT: Negative for congestion and trouble swallowing. Eyes: Negative for pain and itching. Respiratory: Negative for cough and shortness of breath. Cardiovascular: Negative for chest pain and leg swelling. Gastrointestinal: Negative for abdominal pain, constipation, diarrhea and nausea. Endocrine: Negative for cold intolerance and heat intolerance. Genitourinary: Negative for difficulty urinating, frequency and urgency. Musculoskeletal: Positive for arthralgias and back pain. Negative for joint swelling. Skin: Negative for rash. Neurological: Negative for dizziness, weakness, numbness and headaches. Psychiatric/Behavioral: The patient is not nervous/anxious. PAST MEDICAL HISTORY    History reviewed. No pertinent past medical history.     PAST SURGICAL HISTORY      Past Surgical History:   Procedure Laterality Date    BREAST SURGERY  6487    Silicone implants    ENDOMETRIAL ABLATION  2006    heavy periods       FAMILY HISTORY      Family History   Problem Relation Age of Onset    Mult Sclerosis Mother 27    Diabetes Father 72    Heart Disease Father 48        MI at 48    Lung Cancer Father [de-identified]    Diabetes Paternal Grandfather        SOCIAL HISTORY      Social History     Tobacco History     Smoking Status  Never Smoker    Smokeless Tobacco Use  Never Used          Alcohol History     Alcohol Use Status  Yes Drinks/Week  3 Glasses of wine, 2 Cans of beer per week Amount  5.0 standard drinks of alcohol/wk Comment  Occasionally          Drug Use     Drug Use Status  No          Sexual Activity     Sexually Active  Yes Partners  Male                ALLERGIES     Allergies   Allergen Reactions    Sulfa Antibiotics Anaphylaxis       CURRENT MEDICATIONS      Current Outpatient Medications   Medication Sig Dispense Refill    leflunomide (ARAVA) 10 MG tablet Take 1 tablet by mouth daily 30 tablet 0     No current facility-administered medications for this visit. PHYSICAL EXAMINATION / OBJECTIVE   Objective:  BP (!) 108/58 (Site: Left Upper Arm, Position: Sitting, Cuff Size: Large Adult)   Pulse 82   Temp 97.6 °F (36.4 °C)   Ht 5' 2.99\" (1.6 m)   Wt 149 lb 9.6 oz (67.9 kg)   SpO2 98%   BMI 26.51 kg/m²     Physical Exam  Vitals signs reviewed. Constitutional:       Appearance: She is well-developed. Neck:      Musculoskeletal: Normal range of motion and neck supple. Cardiovascular:      Rate and Rhythm: Normal rate and regular rhythm. Pulmonary:      Effort: Pulmonary effort is normal.      Breath sounds: Normal breath sounds. Abdominal:      Palpations: Abdomen is soft. Tenderness: There is no abdominal tenderness. Skin:     General: Skin is warm and dry. Findings: No rash. Neurological:      Mental Status: She is alert and oriented to person, place, and time. Deep Tendon Reflexes: Reflexes are normal and symmetric. ANTI-SMITH  No results found for: DSDNAAB   No results found for: ANTIRNP  No results found for: C3, C4  Lab Results   Component Value Date    CCPAB 5 07/19/2019     Lab Results   Component Value Date    RF < 10 01/03/2019       No components found for: CANCASCRN, APANCASCRN  Lab Results   Component Value Date    SEDRATE 10 08/25/2020     Lab Results   Component Value Date    CRP 0.77 08/25/2020       RADIOLOGY:         ASSESSMENT/PLAN    Assessment   Plan     Polyarthralgia  Chronic bilateral low back pain without sciatica  History of spondyloarthropathy  - joint pains starting 10 years ago, diagnosed with undifferentiated spondyloarthropathy by Dr. Carole Reina around 2016. Was started on methotrexate which made her fatigued and was stopped. Enbrel was started with sig relief of joint pains, however has persistent leukopenia. Patient was lost to follow up. + inflammatory back pain, hx of dactylitis (per [facility] records), + enthesitis. RF CCP negative. - start arava 10 mg PO daily. Side effects discussed including GI intolerance, diarrhea, alopecia, infection; weight loss, low blood counts, liver injury, ILD, oral sores, rash; RARE: neuropathy    Chronic bilateral low back pain without sciatica  - + inflammatory features. No red flag symptoms  - xray lumbar spine w/o abnormalities.      Dry eyes  - negative SSA, SSB    Medication monitoring   - CBC, CMP, sed rate, CRP q 4 weeks x3 w/ arava start      No follow-ups on file. Electronically signed by VIVIANA Salinas CNP on 10/6/2020 at 12:58 PM    New Prescriptions    LEFLUNOMIDE (ARAVA) 10 MG TABLET    Take 1 tablet by mouth daily       Thank you for allowing me to participate in the care of this patient. Please call if there are any questions.

## 2020-10-29 ENCOUNTER — HOSPITAL ENCOUNTER (OUTPATIENT)
Age: 50
Discharge: HOME | End: 2020-10-29
Payer: COMMERCIAL

## 2020-10-29 LAB
ALBUMIN SERPL-MCNC: 4.3 G/DL (ref 3.5–5.1)
ALP BLD-CCNC: 62 U/L (ref 38–126)
ALT SERPL-CCNC: 17 U/L (ref 11–66)
ANION GAP SERPL CALCULATED.3IONS-SCNC: 12 MEQ/L (ref 8–16)
AST SERPL-CCNC: 19 U/L (ref 5–40)
BASOPHILS # BLD: 1.6 %
BASOPHILS ABSOLUTE: 0.1 THOU/MM3 (ref 0–0.1)
BILIRUB SERPL-MCNC: 1.3 MG/DL (ref 0.3–1.2)
BUN BLDV-MCNC: 17 MG/DL (ref 7–22)
C-REACTIVE PROTEIN: 0.23 MG/DL (ref 0–1)
CALCIUM SERPL-MCNC: 9.8 MG/DL (ref 8.5–10.5)
CHLORIDE BLD-SCNC: 103 MEQ/L (ref 98–111)
CO2: 25 MEQ/L (ref 23–33)
CREAT SERPL-MCNC: 0.7 MG/DL (ref 0.4–1.2)
EOSINOPHIL # BLD: 3.6 %
EOSINOPHILS ABSOLUTE: 0.2 THOU/MM3 (ref 0–0.4)
ERYTHROCYTE [DISTWIDTH] IN BLOOD BY AUTOMATED COUNT: 12.4 % (ref 11.5–14.5)
ERYTHROCYTE [DISTWIDTH] IN BLOOD BY AUTOMATED COUNT: 44.1 FL (ref 35–45)
GFR SERPL CREATININE-BSD FRML MDRD: 89 ML/MIN/1.73M2
GLUCOSE BLD-MCNC: 81 MG/DL (ref 70–108)
HCT VFR BLD CALC: 43.3 % (ref 37–47)
HEMOGLOBIN: 14.1 GM/DL (ref 12–16)
IMMATURE GRANS (ABS): 0.01 THOU/MM3 (ref 0–0.07)
IMMATURE GRANULOCYTES: 0.2 %
LYMPHOCYTES # BLD: 28.9 %
LYMPHOCYTES ABSOLUTE: 1.3 THOU/MM3 (ref 1–4.8)
MCH RBC QN AUTO: 31.3 PG (ref 26–33)
MCHC RBC AUTO-ENTMCNC: 32.6 GM/DL (ref 32.2–35.5)
MCV RBC AUTO: 96.2 FL (ref 81–99)
MONOCYTES # BLD: 13.5 %
MONOCYTES ABSOLUTE: 0.6 THOU/MM3 (ref 0.4–1.3)
NUCLEATED RED BLOOD CELLS: 0 /100 WBC
PLATELET # BLD: 181 THOU/MM3 (ref 130–400)
PMV BLD AUTO: 10 FL (ref 9.4–12.4)
POTASSIUM SERPL-SCNC: 3.7 MEQ/L (ref 3.5–5.2)
RBC # BLD: 4.5 MILL/MM3 (ref 4.2–5.4)
SEDIMENTATION RATE, ERYTHROCYTE: 10 MM/HR (ref 0–20)
SEG NEUTROPHILS: 52.2 %
SEGMENTED NEUTROPHILS ABSOLUTE COUNT: 2.3 THOU/MM3 (ref 1.8–7.7)
SODIUM BLD-SCNC: 140 MEQ/L (ref 135–145)
TOTAL PROTEIN: 7.2 G/DL (ref 6.1–8)
WBC # BLD: 4.4 THOU/MM3 (ref 4.8–10.8)

## 2020-10-29 PROCEDURE — 86140 C-REACTIVE PROTEIN: CPT

## 2020-10-29 PROCEDURE — 36415 COLL VENOUS BLD VENIPUNCTURE: CPT

## 2020-10-29 PROCEDURE — 85651 RBC SED RATE NONAUTOMATED: CPT

## 2020-10-29 PROCEDURE — 85025 COMPLETE CBC W/AUTO DIFF WBC: CPT

## 2020-10-29 PROCEDURE — 80053 COMPREHEN METABOLIC PANEL: CPT

## 2020-10-29 RX ORDER — LEFLUNOMIDE 10 MG/1
10 TABLET ORAL DAILY
Qty: 30 TABLET | Refills: 0 | Status: SHIPPED | OUTPATIENT
Start: 2020-10-29 | End: 2020-12-07 | Stop reason: SDUPTHER

## 2020-11-24 RX ORDER — LEFLUNOMIDE 10 MG/1
TABLET ORAL
Qty: 30 TABLET | Refills: 0 | OUTPATIENT
Start: 2020-11-24

## 2020-12-04 ENCOUNTER — HOSPITAL ENCOUNTER (OUTPATIENT)
Age: 50
Discharge: HOME | End: 2020-12-04
Payer: COMMERCIAL

## 2020-12-04 LAB
ALBUMIN SERPL-MCNC: 4.6 G/DL (ref 3.5–5.1)
ALP BLD-CCNC: 57 U/L (ref 38–126)
ALT SERPL-CCNC: 13 U/L (ref 11–66)
ANION GAP SERPL CALCULATED.3IONS-SCNC: 15 MEQ/L (ref 8–16)
AST SERPL-CCNC: 17 U/L (ref 5–40)
BASOPHILS # BLD: 0.6 %
BASOPHILS ABSOLUTE: 0 THOU/MM3 (ref 0–0.1)
BILIRUB SERPL-MCNC: 1.7 MG/DL (ref 0.3–1.2)
BUN BLDV-MCNC: 24 MG/DL (ref 7–22)
C-REACTIVE PROTEIN: 0.26 MG/DL (ref 0–1)
CALCIUM SERPL-MCNC: 9.6 MG/DL (ref 8.5–10.5)
CHLORIDE BLD-SCNC: 99 MEQ/L (ref 98–111)
CO2: 25 MEQ/L (ref 23–33)
CREAT SERPL-MCNC: 0.7 MG/DL (ref 0.4–1.2)
EOSINOPHIL # BLD: 2 %
EOSINOPHILS ABSOLUTE: 0.1 THOU/MM3 (ref 0–0.4)
ERYTHROCYTE [DISTWIDTH] IN BLOOD BY AUTOMATED COUNT: 12 % (ref 11.5–14.5)
ERYTHROCYTE [DISTWIDTH] IN BLOOD BY AUTOMATED COUNT: 41.5 FL (ref 35–45)
GFR SERPL CREATININE-BSD FRML MDRD: 89 ML/MIN/1.73M2
GLUCOSE BLD-MCNC: 87 MG/DL (ref 70–108)
HAV IGM SER IA-ACNC: NEGATIVE
HCT VFR BLD CALC: 44.3 % (ref 37–47)
HEMOGLOBIN: 14.8 GM/DL (ref 12–16)
HEPATITIS B CORE IGM ANTIBODY: NEGATIVE
HEPATITIS B SURFACE ANTIGEN: NEGATIVE
HEPATITIS C ANTIBODY: NEGATIVE
IMMATURE GRANS (ABS): 0.01 THOU/MM3 (ref 0–0.07)
IMMATURE GRANULOCYTES: 0.2 %
LYMPHOCYTES # BLD: 16.8 %
LYMPHOCYTES ABSOLUTE: 1.1 THOU/MM3 (ref 1–4.8)
MCH RBC QN AUTO: 31.4 PG (ref 26–33)
MCHC RBC AUTO-ENTMCNC: 33.4 GM/DL (ref 32.2–35.5)
MCV RBC AUTO: 93.9 FL (ref 81–99)
MONOCYTES # BLD: 7.3 %
MONOCYTES ABSOLUTE: 0.5 THOU/MM3 (ref 0.4–1.3)
NUCLEATED RED BLOOD CELLS: 0 /100 WBC
PLATELET # BLD: 207 THOU/MM3 (ref 130–400)
PMV BLD AUTO: 9.5 FL (ref 9.4–12.4)
POTASSIUM SERPL-SCNC: 4.4 MEQ/L (ref 3.5–5.2)
RBC # BLD: 4.72 MILL/MM3 (ref 4.2–5.4)
SEDIMENTATION RATE, ERYTHROCYTE: 14 MM/HR (ref 0–20)
SEG NEUTROPHILS: 73.1 %
SEGMENTED NEUTROPHILS ABSOLUTE COUNT: 4.7 THOU/MM3 (ref 1.8–7.7)
SODIUM BLD-SCNC: 139 MEQ/L (ref 135–145)
TOTAL PROTEIN: 7.6 G/DL (ref 6.1–8)
WBC # BLD: 6.4 THOU/MM3 (ref 4.8–10.8)

## 2020-12-04 PROCEDURE — 86235 NUCLEAR ANTIGEN ANTIBODY: CPT

## 2020-12-04 PROCEDURE — 80074 ACUTE HEPATITIS PANEL: CPT

## 2020-12-04 PROCEDURE — 86480 TB TEST CELL IMMUN MEASURE: CPT

## 2020-12-04 PROCEDURE — 86140 C-REACTIVE PROTEIN: CPT

## 2020-12-04 PROCEDURE — 80053 COMPREHEN METABOLIC PANEL: CPT

## 2020-12-04 PROCEDURE — 85651 RBC SED RATE NONAUTOMATED: CPT

## 2020-12-04 PROCEDURE — 36415 COLL VENOUS BLD VENIPUNCTURE: CPT

## 2020-12-04 PROCEDURE — 85025 COMPLETE CBC W/AUTO DIFF WBC: CPT

## 2020-12-07 LAB
QUANTI TB GOLD PLUS: NEGATIVE
QUANTI TB1 MINUS NIL: 0.04 IU/ML (ref 0–0.34)
QUANTI TB2 MINUS NIL: 0.04 IU/ML (ref 0–0.34)
QUANTIFERON MITOGEN MINUS NIL: > 10 IU/ML
QUANTIFERON NIL: 0.03 IU/ML

## 2020-12-07 RX ORDER — LEFLUNOMIDE 10 MG/1
10 TABLET ORAL DAILY
Qty: 30 TABLET | Refills: 0 | Status: SHIPPED | OUTPATIENT
Start: 2020-12-07 | End: 2020-12-10

## 2020-12-08 LAB
ANTI SSA: NORMAL
ANTI SSB: 1 AU/ML (ref 0–40)

## 2020-12-10 ENCOUNTER — OFFICE VISIT (OUTPATIENT)
Dept: RHEUMATOLOGY | Age: 50
End: 2020-12-10
Payer: COMMERCIAL

## 2020-12-10 VITALS
DIASTOLIC BLOOD PRESSURE: 80 MMHG | BODY MASS INDEX: 26.05 KG/M2 | HEART RATE: 78 BPM | SYSTOLIC BLOOD PRESSURE: 122 MMHG | HEIGHT: 63 IN | OXYGEN SATURATION: 97 % | WEIGHT: 147 LBS

## 2020-12-10 PROCEDURE — 99214 OFFICE O/P EST MOD 30 MIN: CPT | Performed by: INTERNAL MEDICINE

## 2020-12-10 PROCEDURE — 1036F TOBACCO NON-USER: CPT | Performed by: INTERNAL MEDICINE

## 2020-12-10 PROCEDURE — G8484 FLU IMMUNIZE NO ADMIN: HCPCS | Performed by: INTERNAL MEDICINE

## 2020-12-10 PROCEDURE — G8419 CALC BMI OUT NRM PARAM NOF/U: HCPCS | Performed by: INTERNAL MEDICINE

## 2020-12-10 PROCEDURE — G8427 DOCREV CUR MEDS BY ELIG CLIN: HCPCS | Performed by: INTERNAL MEDICINE

## 2020-12-10 RX ORDER — LEFLUNOMIDE 20 MG/1
20 TABLET ORAL DAILY
Qty: 30 TABLET | Refills: 0 | Status: SHIPPED | OUTPATIENT
Start: 2020-12-10 | End: 2021-02-02 | Stop reason: SINTOL

## 2020-12-10 ASSESSMENT — ENCOUNTER SYMPTOMS
CONSTIPATION: 0
COUGH: 0
DIARRHEA: 0
SHORTNESS OF BREATH: 0
TROUBLE SWALLOWING: 0
BACK PAIN: 1
NAUSEA: 0
ABDOMINAL PAIN: 0
EYE PAIN: 0
EYE ITCHING: 0

## 2020-12-10 NOTE — PROGRESS NOTES
[facility] UP NOTE       Date Of Service: 12/10/2020  Provider: Troy Brito DO    Name: Regan De La Torre   MRN: [id number]    Chief Complaint(s)     Chief Complaint   Patient presents with    Follow-up     2 MONTHS Undifferectiated Spondyloarthopathy        History of Present Illness (HPI)     Regan De La Torre  is a(n)50 y.o. female with a hx of undifferentiated spondyloarthropathy here for the f/u evaluation of undifferentiated spondyloarthropathy     Arava started Oct 2020 - mild roughness of skin along the cheeks reported. - no relief reported. Increased pain Rt knee , bilateral ankles, and hands with episodes of  Redness,  pain and swelling of the PIPs and DIP last occurring 2 weeks ago, and lasting about 4 days. Pain currently bilatearl fingers, right elbows, bilateral hips, knees, ankles, toes neck and back. Pain 4/10,. Constant variable pain in the Rt knee , bilat. Ankles Timing: mornings  Aggravating factors: hands: certain foods, hips/knees: sitting for long periods, back: sitting for long periods  Alleviating factors: movement       + AM stiffness lasting ~ 60 minutes, + fatigue, myalgia - outer hips. REVIEW OF SYSTEMS: (ROS)    Review of Systems   Constitutional: Negative for fatigue, fever and unexpected weight change. HENT: Negative for congestion and trouble swallowing. Eyes: Negative for pain and itching. Respiratory: Negative for cough and shortness of breath. Cardiovascular: Negative for chest pain and leg swelling. Gastrointestinal: Negative for abdominal pain, constipation, diarrhea and nausea. Endocrine: Negative for cold intolerance and heat intolerance. Genitourinary: Negative for difficulty urinating, frequency and urgency. Musculoskeletal: Positive for arthralgias and back pain. Negative for joint swelling. Skin: Negative for rash. Neurological: Negative for dizziness, weakness, numbness and headaches.    Psychiatric/Behavioral: The patient is not nervous/anxious. PAST MEDICAL HISTORY    History reviewed. No pertinent past medical history. PAST SURGICAL HISTORY      Past Surgical History:   Procedure Laterality Date    BREAST SURGERY  0512    Silicone implants    ENDOMETRIAL ABLATION  2006    heavy periods       FAMILY HISTORY      Family History   Problem Relation Age of Onset    Mult Sclerosis Mother 27    Diabetes Father 72    Heart Disease Father 48        MI at 48    Lung Cancer Father [de-identified]    Diabetes Paternal Grandfather        SOCIAL HISTORY      Social History     Tobacco History     Smoking Status  Never Smoker    Smokeless Tobacco Use  Never Used          Alcohol History     Alcohol Use Status  Yes Drinks/Week  3 Glasses of wine, 2 Cans of beer per week Amount  5.0 standard drinks of alcohol/wk Comment  Occasionally          Drug Use     Drug Use Status  No          Sexual Activity     Sexually Active  Yes Partners  Male                ALLERGIES     Allergies   Allergen Reactions    Sulfa Antibiotics Anaphylaxis       CURRENT MEDICATIONS      Current Outpatient Medications   Medication Sig Dispense Refill    leflunomide (ARAVA) 10 MG tablet Take 1 tablet by mouth daily 30 tablet 0     No current facility-administered medications for this visit. PHYSICAL EXAMINATION / OBJECTIVE   Objective:  /80 (Site: Left Upper Arm, Position: Sitting, Cuff Size: Medium Adult)   Pulse 78   Ht 5' 2.99\" (1.6 m)   Wt 147 lb (66.7 kg)   SpO2 97%   BMI 26.05 kg/m²     Physical Exam  Vitals signs reviewed. Constitutional:       Appearance: She is well-developed. Neck:      Musculoskeletal: Normal range of motion and neck supple. Cardiovascular:      Rate and Rhythm: Normal rate and regular rhythm. Pulmonary:      Effort: Pulmonary effort is normal.      Breath sounds: Normal breath sounds. Abdominal:      Palpations: Abdomen is soft. Tenderness: There is no abdominal tenderness.    Skin:     General: Skin is warm and dry. Findings: No rash. Neurological:      Mental Status: She is alert and oriented to person, place, and time. Deep Tendon Reflexes: Reflexes are normal and symmetric. Psychiatric:         Thought Content: Thought content normal.       Musculoskeletal:     Normal gait. Strength 5/5 in biceps, triceps, hips, knees,      Upper extremities:   Shoulder, wrists - non-tender, no swelling   Elbows, - tender left. No swelling   Hands:      + tender right 2,3, PIP. Lower extremities:  Hips:       + tender bilat, No Deformities and intact ROM  Knees :   Nt, NS   Ankles:   + tender bilat,   Feet:        Tender posterior heals.      Spine:     + tender lumbar spine        RAPID 3:   12/10/2020 --- RAPID 3: 0.3 + 3.5 + 1 = 4.8     Remission: <3  Low Disease Activity: <6  Moderate Disease Activity: >=6 and <=12  High Disease Activity: >12     LABS    CBC  Lab Results   Component Value Date    WBC 6.4 12/04/2020    RBC 4.72 12/04/2020    RBC 4.34 04/23/2012    HGB 14.8 12/04/2020    HCT 44.3 12/04/2020    MCV 93.9 12/04/2020    MCH 31.4 12/04/2020    MCHC 33.4 12/04/2020    RDW 13.3 12/19/2017     12/04/2020       CMP  Lab Results   Component Value Date    CALCIUM 9.6 12/04/2020    LABALBU 4.6 12/04/2020    PROT 7.6 12/04/2020     12/04/2020    K 4.4 12/04/2020    CO2 25 12/04/2020    CL 99 12/04/2020    BUN 24 12/04/2020    CREATININE 0.7 12/04/2020    ALKPHOS 57 12/04/2020    ALT 13 12/04/2020    AST 17 12/04/2020       HgBA1c: No components found for: HGBA1C    Lab Results   Component Value Date    VITD25 32 01/03/2019         Lab Results   Component Value Date    ANASCRN None Detected 01/03/2019     Lab Results   Component Value Date    SSA SEE BELOW 12/04/2020     Lab Results   Component Value Date    SSB 1 12/04/2020     No results found for: ANTI-SMITH  No results found for: DSDNAAB   No results found for: ANTIRNP  No results found for: C3, C4  Lab Results   Component Value Date    CCPAB 5 07/19/2019     Lab Results   Component Value Date    RF < 10 01/03/2019       No components found for: CANCASCRN, APANCASCRN  Lab Results   Component Value Date    SEDRATE 14 12/04/2020     Lab Results   Component Value Date    CRP 0.26 12/04/2020       RADIOLOGY:         ASSESSMENT/PLAN    Assessment   Plan     Undiff spondyloarthritis - manifested with synovitis, enthesitis, chronic low back apin. Failed - methotrexate (fatigue), Enbrel (leukopenia)    Increase Arava to 20mg daily     If continued flares, joint swelling will pursue biologic such as humira or IL17 inhibition      Chronic bilateral low back pain without sciatica   - + inflammatory features. No red flag symptoms   - xray lumbar spine w/o abnormalities.      Dry eyes - negative SSA, SSB    Medication monitoring - CBC, CMP, sed rate, CRP q 4 weeks x3 w/ arava titration     Enc for vaccination: prevnar 13 --- 12/10/2020       No follow-ups on file. Electronically signed by Michoacano Gonzalez DO on 12/10/2020 at 11:57 AM    New Prescriptions    No medications on file       Thank you for allowing me to participate in the care of this patient. Please call if there are any questions.

## 2020-12-10 NOTE — PROGRESS NOTES
Immunizations Administered     Name Date Dose Route    Pneumococcal Conjugate 13-valent ([id number]) 12/10/2020 0.5 mL Intramuscular    Lot: [id number]    NDC: 2381-4935-93       PT  Tolerated well.

## 2021-01-01 DIAGNOSIS — M47.819 UNDIFFERENTIATED SPONDYLOARTHROPATHY: ICD-10-CM

## 2021-01-04 RX ORDER — LEFLUNOMIDE 20 MG/1
TABLET ORAL
Qty: 30 TABLET | Refills: 0 | OUTPATIENT
Start: 2021-01-04

## 2021-01-08 ENCOUNTER — HOSPITAL ENCOUNTER (OUTPATIENT)
Age: 51
Discharge: HOME | End: 2021-01-08
Payer: COMMERCIAL

## 2021-01-08 LAB — FOLLICLE STIMULATING HORMONE: 39.2 MIU/ML (ref 16–160)

## 2021-01-08 PROCEDURE — 36415 COLL VENOUS BLD VENIPUNCTURE: CPT

## 2021-01-08 PROCEDURE — 83001 ASSAY OF GONADOTROPIN (FSH): CPT

## 2021-02-02 ENCOUNTER — OFFICE VISIT (OUTPATIENT)
Dept: FAMILY MEDICINE CLINIC | Age: 51
End: 2021-02-02
Payer: COMMERCIAL

## 2021-02-02 DIAGNOSIS — M47.819 UNDIFFERENTIATED SPONDYLOARTHROPATHY: ICD-10-CM

## 2021-02-02 DIAGNOSIS — Z13.220 SCREENING CHOLESTEROL LEVEL: ICD-10-CM

## 2021-02-02 DIAGNOSIS — U07.1 COVID-19 VIRUS DETECTED: Primary | ICD-10-CM

## 2021-02-02 PROCEDURE — 99213 OFFICE O/P EST LOW 20 MIN: CPT | Performed by: NURSE PRACTITIONER

## 2021-02-02 PROCEDURE — G8427 DOCREV CUR MEDS BY ELIG CLIN: HCPCS | Performed by: NURSE PRACTITIONER

## 2021-02-02 RX ORDER — BENZONATATE 200 MG/1
200 CAPSULE ORAL 3 TIMES DAILY PRN
Qty: 30 CAPSULE | Refills: 0 | Status: SHIPPED | OUTPATIENT
Start: 2021-02-02 | End: 2021-02-09

## 2021-02-02 RX ORDER — GUAIFENESIN AND DEXTROMETHORPHAN HYDROBROMIDE 1200; 60 MG/1; MG/1
1 TABLET, EXTENDED RELEASE ORAL 2 TIMES DAILY
Qty: 14 TABLET | Refills: 0 | Status: SHIPPED | OUTPATIENT
Start: 2021-02-02 | End: 2021-08-30 | Stop reason: ALTCHOICE

## 2021-02-02 ASSESSMENT — ENCOUNTER SYMPTOMS
COUGH: 1
CHEST TIGHTNESS: 1

## 2021-02-02 NOTE — PROGRESS NOTES
Constitutional: [x] Appears well-developed and well-nourished [x] No apparent distress      [] Abnormal -     Mental status: [x] Alert and awake  [x] Oriented to person/place/time [x] Able to follow commands    [] Abnormal -     Eyes:   EOM    [x]  Normal    [] Abnormal -   Sclera  [x]  Normal    [] Abnormal -          Discharge [x]  None visible   [] Abnormal -     HENT: [x] Normocephalic, atraumatic  [] Abnormal -   [x] Mouth/Throat: Mucous membranes are moist    External Ears [x] Normal  [] Abnormal -    Neck: [x] No visualized mass [] Abnormal -     Pulmonary/Chest: [x] Respiratory effort normal   [x] No visualized signs of difficulty breathing or respiratory distress        [] Abnormal -      Musculoskeletal:   [x] Normal gait with no signs of ataxia         [x] Normal range of motion of neck        [] Abnormal -     Neurological:        [x] No Facial Asymmetry (Cranial nerve 7 motor function) (limited exam due to video visit)          [x] No gaze palsy        [] Abnormal -          Skin:        [x] No significant exanthematous lesions or discoloration noted on facial skin         [] Abnormal -            Psychiatric:       [x] Normal Affect [] Abnormal -        [x] No Hallucinations    Other pertinent observable physical exam findings:- Angélica Oakes is a 53 y.o. female being evaluated by a Virtual Visit (video visit) encounter to address concerns as mentioned above. A caregiver was present when appropriate. Due to this being a TeleHealth encounter (During [facility] public health emergency), evaluation of the following organ systems was limited: Vitals/Constitutional/EENT/Resp/CV/GI//MS/Neuro/Skin/Heme-Lymph-Imm. Pursuant to the emergency declaration under the [address] [place] [address], [address] [place] [address] and the [employer] Act, this Virtual Visit was conducted with patient's (and/or legal guardian's) consent, to reduce the patient's risk of exposure to COVID-19 and provide necessary medical care. The patient (and/or legal guardian) has also been advised to contact this office for worsening conditions or problems, and seek emergency medical treatment and/or call 911 if deemed necessary. Patient identification was verified at the start of the visit: Yes    Services were provided through a video synchronous discussion virtually to substitute for in-person clinic visit. Patient was located at home and provider was located in office or at home. An electronic signature was used to authenticate this note.     --Brittanie Nicholson, VIVIANA - CNP

## 2021-03-10 ENCOUNTER — OFFICE VISIT (OUTPATIENT)
Dept: RHEUMATOLOGY | Age: 51
End: 2021-03-10
Payer: COMMERCIAL

## 2021-03-10 VITALS
HEART RATE: 82 BPM | OXYGEN SATURATION: 98 % | WEIGHT: 150.4 LBS | DIASTOLIC BLOOD PRESSURE: 64 MMHG | TEMPERATURE: 97.4 F | HEIGHT: 63 IN | SYSTOLIC BLOOD PRESSURE: 108 MMHG | BODY MASS INDEX: 26.65 KG/M2

## 2021-03-10 DIAGNOSIS — Z51.81 MEDICATION MONITORING ENCOUNTER: ICD-10-CM

## 2021-03-10 DIAGNOSIS — M45.A0 NON-RADIOGRAPHIC AXIAL SPONDYLOARTHRITIS (HCC): Primary | ICD-10-CM

## 2021-03-10 DIAGNOSIS — M54.50 CHRONIC BILATERAL LOW BACK PAIN WITHOUT SCIATICA: ICD-10-CM

## 2021-03-10 DIAGNOSIS — M47.819 UNDIFFERENTIATED SPONDYLOARTHROPATHY: ICD-10-CM

## 2021-03-10 DIAGNOSIS — G89.29 CHRONIC BILATERAL LOW BACK PAIN WITHOUT SCIATICA: ICD-10-CM

## 2021-03-10 PROCEDURE — 1036F TOBACCO NON-USER: CPT | Performed by: NURSE PRACTITIONER

## 2021-03-10 PROCEDURE — G8427 DOCREV CUR MEDS BY ELIG CLIN: HCPCS | Performed by: NURSE PRACTITIONER

## 2021-03-10 PROCEDURE — G8419 CALC BMI OUT NRM PARAM NOF/U: HCPCS | Performed by: NURSE PRACTITIONER

## 2021-03-10 PROCEDURE — 99214 OFFICE O/P EST MOD 30 MIN: CPT | Performed by: NURSE PRACTITIONER

## 2021-03-10 PROCEDURE — G8484 FLU IMMUNIZE NO ADMIN: HCPCS | Performed by: NURSE PRACTITIONER

## 2021-03-10 PROCEDURE — 3017F COLORECTAL CA SCREEN DOC REV: CPT | Performed by: NURSE PRACTITIONER

## 2021-03-10 RX ORDER — SECUKINUMAB 150 MG/ML
INJECTION SUBCUTANEOUS
Qty: 1 ML | Refills: 0 | Status: SHIPPED | OUTPATIENT
Start: 2021-03-10 | End: 2021-11-11

## 2021-03-10 RX ORDER — SECUKINUMAB 150 MG/ML
INJECTION SUBCUTANEOUS
Qty: 5 ML | Refills: 0 | Status: SHIPPED | OUTPATIENT
Start: 2021-03-10 | End: 2021-06-10 | Stop reason: SDUPTHER

## 2021-03-10 ASSESSMENT — ENCOUNTER SYMPTOMS
ABDOMINAL PAIN: 0
CONSTIPATION: 0
NAUSEA: 0
DIARRHEA: 0
SHORTNESS OF BREATH: 0
EYE ITCHING: 0
EYE PAIN: 0
BACK PAIN: 1
TROUBLE SWALLOWING: 0
COUGH: 0

## 2021-03-10 NOTE — PROGRESS NOTES
Past Surgical History:   Procedure Laterality Date    BREAST SURGERY  5584    Silicone implants    ENDOMETRIAL ABLATION  2006    heavy periods       FAMILY HISTORY      Family History   Problem Relation Age of Onset    Mult Sclerosis Mother 27    Diabetes Father 72    Heart Disease Father 48        MI at 48    Lung Cancer Father [de-identified]    Diabetes Paternal Grandfather        SOCIAL HISTORY      Social History     Tobacco History     Smoking Status  Never Smoker    Smokeless Tobacco Use  Never Used          Alcohol History     Alcohol Use Status  Yes Drinks/Week  3 Glasses of wine, 2 Cans of beer per week Amount  5.0 standard drinks of alcohol/wk Comment  Occasionally          Drug Use     Drug Use Status  No          Sexual Activity     Sexually Active  Yes Partners  Male                ALLERGIES     Allergies   Allergen Reactions    Sulfa Antibiotics Anaphylaxis       CURRENT MEDICATIONS      Current Outpatient Medications   Medication Sig Dispense Refill    Dextromethorphan-guaiFENesin  MG TB12 Take 1 tablet by mouth 2 times daily 14 tablet 0     No current facility-administered medications for this visit. PHYSICAL EXAMINATION / OBJECTIVE   Objective:  /64 (Site: Left Upper Arm, Position: Sitting, Cuff Size: Medium Adult)   Pulse 82   Temp 97.4 °F (36.3 °C)   Ht 5' 2.99\" (1.6 m)   Wt 150 lb 6.4 oz (68.2 kg)   SpO2 98%   BMI 26.65 kg/m²     Physical Exam  Vitals signs reviewed. Constitutional:       Appearance: She is well-developed. Neck:      Musculoskeletal: Normal range of motion and neck supple. Cardiovascular:      Rate and Rhythm: Normal rate and regular rhythm. Pulmonary:      Effort: Pulmonary effort is normal.      Breath sounds: Normal breath sounds. Abdominal:      Palpations: Abdomen is soft. Tenderness: There is no abdominal tenderness. Skin:     General: Skin is warm and dry. Findings: No rash.    Neurological:      Mental Status: She is alert and oriented to person, place, and time. Deep Tendon Reflexes: Reflexes are normal and symmetric. Psychiatric:         Thought Content: Thought content normal.       Musculoskeletal:     Normal gait. Strength 5/5 in biceps, triceps, hips, knees,      Upper extremities:   Shoulders:  Nontender, no swelling, No Deformities and intact ROM  Elbows:      Nontender, no swelling, No Deformities and intact ROM  Wrists        Nontender, no swelling, No Deformities and intact ROM  Hands:      + tender right 3,4 MCPs, right 2-4, left 3,4 PIPs, and DIPs bilat. + bogginess left 2-4 PIPs    Lower extremities:  Hips:       + tender bilat, No Deformities and intact ROM  Knees :   Nontender, no swelling, No Deformities and intact ROM  Ankles:   + tender bilat, no swelling, No Deformities and intact ROM  Feet:       + MTp compression bilat, right posterior heel tenderness and warmth.      Spine:     + tender lumbar spine        RAPID 3:   3/10/2021 --- RAPID 3: 0 + 4 + 3 = 7     Remission: <3  Low Disease Activity: <6  Moderate Disease Activity: >=6 and <=12  High Disease Activity: >12     LABS    CBC  Lab Results   Component Value Date    WBC 6.4 12/04/2020    RBC 4.72 12/04/2020    RBC 4.34 04/23/2012    HGB 14.8 12/04/2020    HCT 44.3 12/04/2020    MCV 93.9 12/04/2020    MCH 31.4 12/04/2020    MCHC 33.4 12/04/2020    RDW 13.3 12/19/2017     12/04/2020       CMP  Lab Results   Component Value Date    CALCIUM 9.6 12/04/2020    LABALBU 4.6 12/04/2020    PROT 7.6 12/04/2020     12/04/2020    K 4.4 12/04/2020    CO2 25 12/04/2020    CL 99 12/04/2020    BUN 24 12/04/2020    CREATININE 0.7 12/04/2020    ALKPHOS 57 12/04/2020    ALT 13 12/04/2020    AST 17 12/04/2020       HgBA1c: No components found for: HGBA1C    Lab Results   Component Value Date    VITD25 32 01/03/2019         Lab Results   Component Value Date    ANASCRN None Detected 01/03/2019     Lab Results   Component Value Date    SSA SEE BELOW 12/04/2020

## 2021-03-19 ENCOUNTER — HOSPITAL ENCOUNTER (OUTPATIENT)
Dept: WOMENS IMAGING | Age: 51
Discharge: HOME | End: 2021-03-19
Payer: COMMERCIAL

## 2021-03-19 DIAGNOSIS — Z12.31 VISIT FOR SCREENING MAMMOGRAM: ICD-10-CM

## 2021-03-19 PROCEDURE — 77063 BREAST TOMOSYNTHESIS BI: CPT

## 2021-05-17 ENCOUNTER — TELEPHONE (OUTPATIENT)
Dept: RHEUMATOLOGY | Age: 51
End: 2021-05-17

## 2021-05-17 ENCOUNTER — OFFICE VISIT (OUTPATIENT)
Dept: RHEUMATOLOGY | Age: 51
End: 2021-05-17
Payer: COMMERCIAL

## 2021-05-17 VITALS
OXYGEN SATURATION: 98 % | DIASTOLIC BLOOD PRESSURE: 78 MMHG | HEIGHT: 64 IN | WEIGHT: 146 LBS | BODY MASS INDEX: 24.92 KG/M2 | SYSTOLIC BLOOD PRESSURE: 118 MMHG | HEART RATE: 78 BPM

## 2021-05-17 DIAGNOSIS — M47.819 UNDIFFERENTIATED SPONDYLOARTHROPATHY: ICD-10-CM

## 2021-05-17 DIAGNOSIS — G89.29 CHRONIC BILATERAL LOW BACK PAIN WITHOUT SCIATICA: ICD-10-CM

## 2021-05-17 DIAGNOSIS — Z51.81 MEDICATION MONITORING ENCOUNTER: ICD-10-CM

## 2021-05-17 DIAGNOSIS — M45.A0 NON-RADIOGRAPHIC AXIAL SPONDYLOARTHRITIS (HCC): Primary | ICD-10-CM

## 2021-05-17 DIAGNOSIS — M54.50 CHRONIC BILATERAL LOW BACK PAIN WITHOUT SCIATICA: ICD-10-CM

## 2021-05-17 PROCEDURE — G8420 CALC BMI NORM PARAMETERS: HCPCS | Performed by: NURSE PRACTITIONER

## 2021-05-17 PROCEDURE — 3017F COLORECTAL CA SCREEN DOC REV: CPT | Performed by: NURSE PRACTITIONER

## 2021-05-17 PROCEDURE — 99214 OFFICE O/P EST MOD 30 MIN: CPT | Performed by: NURSE PRACTITIONER

## 2021-05-17 PROCEDURE — G8427 DOCREV CUR MEDS BY ELIG CLIN: HCPCS | Performed by: NURSE PRACTITIONER

## 2021-05-17 PROCEDURE — 1036F TOBACCO NON-USER: CPT | Performed by: NURSE PRACTITIONER

## 2021-05-17 RX ORDER — NAPROXEN 250 MG/1
250 TABLET ORAL 2 TIMES DAILY PRN
COMMUNITY

## 2021-05-17 RX ORDER — PREDNISONE 10 MG/1
TABLET ORAL
Qty: 30 TABLET | Refills: 0 | Status: SHIPPED | OUTPATIENT
Start: 2021-05-17 | End: 2021-06-01

## 2021-05-17 ASSESSMENT — ENCOUNTER SYMPTOMS
TROUBLE SWALLOWING: 0
NAUSEA: 0
BACK PAIN: 1
ABDOMINAL PAIN: 0
CONSTIPATION: 0
COUGH: 0
EYE ITCHING: 0
EYE PAIN: 0
SHORTNESS OF BREATH: 0
DIARRHEA: 0

## 2021-05-17 NOTE — PROGRESS NOTES
[facility] RHEUMATOLOGY FOLLOW UP NOTE       Date Of Service: 5/17/2021  Provider: VIVIANA Cramer - CNP    Name: Emmanuel Grant   MRN: [id number]    Chief Complaint(s)     Chief Complaint   Patient presents with    Follow-up     2 month eval rash         History of Present Illness (HPI)     Emmanuel Grant  is a(n)51 y.o. female with a hx of undifferentiated spondyloarthropathy here for the f/u evaluation of undifferentiated spondyloarthropathy     Interval hx:    - has not started cosentyx yet, has had increased itching on bilateral upper extremities along with worsened left elbow pain    pain affecting the fingers,elbows, hips, right knee, ankles, right toes, neck, back  Pain on a scale 0-10: 4/10  Type of pain: constant in ankles  Timing: mornings    Aggravating factors: hands: certain foods, hips/knees: sitting for long periods, back: sitting for long periods   Alleviating factors: movement      Associated symptoms:  denies swelling/  Redness/ warmth, + AM stiffness lasting ~ 30 minutes    REVIEW OF SYSTEMS: (ROS)    Review of Systems   Constitutional: Positive for fatigue. Negative for fever and unexpected weight change. HENT: Negative for congestion and trouble swallowing. Eyes: Negative for pain and itching. Respiratory: Negative for cough and shortness of breath. Cardiovascular: Negative for chest pain and leg swelling. Gastrointestinal: Negative for abdominal pain, constipation, diarrhea and nausea. Endocrine: Negative for cold intolerance and heat intolerance. Genitourinary: Negative for difficulty urinating, frequency and urgency. Musculoskeletal: Positive for arthralgias, back pain and myalgias. Negative for joint swelling. Skin: Positive for rash. Neurological: Positive for weakness and headaches. Negative for dizziness and numbness. Psychiatric/Behavioral: Positive for sleep disturbance. The patient is not nervous/anxious.         PAST MEDICAL HISTORY History reviewed. No pertinent past medical history. PAST SURGICAL HISTORY      Past Surgical History:   Procedure Laterality Date    BREAST ENHANCEMENT SURGERY      BREAST SURGERY  2123    Silicone implants    ENDOMETRIAL ABLATION  2006    heavy periods       FAMILY HISTORY      Family History   Problem Relation Age of Onset    Mult Sclerosis Mother 27    Diabetes Father 72    Heart Disease Father 48        MI at 48    Lung Cancer Father [de-identified]    Diabetes Paternal Grandfather        SOCIAL HISTORY      Social History     Tobacco History     Smoking Status  Never Smoker    Smokeless Tobacco Use  Never Used          Alcohol History     Alcohol Use Status  Yes Drinks/Week  3 Glasses of wine, 2 Cans of beer per week Amount  5.0 standard drinks of alcohol/wk Comment  Occasionally          Drug Use     Drug Use Status  No          Sexual Activity     Sexually Active  Yes Partners  Male                ALLERGIES     Allergies   Allergen Reactions    Sulfa Antibiotics Anaphylaxis       CURRENT MEDICATIONS      Current Outpatient Medications   Medication Sig Dispense Refill    naproxen (NAPROSYN) 250 MG tablet Take 250 mg by mouth 2 times daily as needed for Pain      secukinumab (COSENTYX) 150 MG/ML SOSY Inject 150 mg subcu weekly for 5 weeks, then every 4 weeks thereafter. (Patient not taking: Reported on 5/17/2021) 5 mL 0    secukinumab (COSENTYX) 150 MG/ML SOSY Inject 150 mg subcutaneously every 4 weeks (start after loading dose) (Patient not taking: Reported on 5/17/2021) 1 mL 0    Dextromethorphan-guaiFENesin  MG TB12 Take 1 tablet by mouth 2 times daily (Patient not taking: Reported on 5/17/2021) 14 tablet 0     No current facility-administered medications for this visit.        PHYSICAL EXAMINATION / OBJECTIVE   Objective:  /78 (Site: Left Upper Arm, Position: Sitting, Cuff Size: Medium Adult)   Pulse 78   Ht 5' 4.17\" (1.63 m)   Wt 146 lb (66.2 kg)   SpO2 98%   BMI 24.93 kg/m² Physical Exam  Vitals reviewed. Constitutional:       Appearance: She is well-developed. Cardiovascular:      Rate and Rhythm: Normal rate and regular rhythm. Pulmonary:      Effort: Pulmonary effort is normal.      Breath sounds: Normal breath sounds. Abdominal:      Palpations: Abdomen is soft. Tenderness: There is no abdominal tenderness. Musculoskeletal:      Cervical back: Normal range of motion and neck supple. Skin:     General: Skin is warm and dry. Findings: No rash. Neurological:      Mental Status: She is alert and oriented to person, place, and time. Deep Tendon Reflexes: Reflexes are normal and symmetric. Psychiatric:         Thought Content: Thought content normal.       Musculoskeletal:     Normal gait.      Strength 5/5 in biceps, triceps, hips, knees,      Upper extremities:   Shoulders:  Nontender, no swelling,   Elbows:      + tender, mild warmth left  Wrists        Nontender, no swelling,   Hands:      + tender right 3,4 MCPs, right 2-4, left 3,4 PIPs, and DIPs bilat. + bogginess left 2-4 PIPs    Lower extremities:  Hips:       + tender bilat  Knees :   Nontender, no swelling  Ankles:   + tender bilat, no swelling,   Feet:       + MTP compression bilat, tender bilat posterior heels, + warmth right posterior heel    Spine:     + tender lumbar spine        RAPID 3:   5/17/2021 --- RAPID 3: 0 + 4 + 3 = 7     Remission: <3  Low Disease Activity: <6  Moderate Disease Activity: >=6 and <=12  High Disease Activity: >12     LABS    CBC  Lab Results   Component Value Date    WBC 6.4 12/04/2020    RBC 4.72 12/04/2020    RBC 4.34 04/23/2012    HGB 14.8 12/04/2020    HCT 44.3 12/04/2020    MCV 93.9 12/04/2020    MCH 31.4 12/04/2020    MCHC 33.4 12/04/2020    RDW 13.3 12/19/2017     12/04/2020       CMP  Lab Results   Component Value Date    CALCIUM 9.6 12/04/2020    LABALBU 4.6 12/04/2020    PROT 7.6 12/04/2020     12/04/2020    K 4.4 12/04/2020    CO2 25 12/04/2020    CL 99 12/04/2020    BUN 24 12/04/2020    CREATININE 0.7 12/04/2020    ALKPHOS 57 12/04/2020    ALT 13 12/04/2020    AST 17 12/04/2020       HgBA1c: No components found for: HGBA1C    Lab Results   Component Value Date    VITD25 32 01/03/2019         Lab Results   Component Value Date    ANASCRN None Detected 01/03/2019     Lab Results   Component Value Date    SSA SEE BELOW 12/04/2020     Lab Results   Component Value Date    SSB 1 12/04/2020     No results found for: ANTI-SMITH  No results found for: DSDNAAB   No results found for: ANTIRNP  No results found for: C3, C4  Lab Results   Component Value Date    CCPAB 5 07/19/2019     Lab Results   Component Value Date    RF < 10 01/03/2019       No components found for: CANCASCRN, APANCASCRN  Lab Results   Component Value Date    SEDRATE 14 12/04/2020     Lab Results   Component Value Date    CRP 0.26 12/04/2020       RADIOLOGY:         ASSESSMENT/PLAN    Assessment   Plan     Undiff spondyloarthritis  Non radiographic axial spondyloarthropathy  - synovitis, enthesitis, chronic low back pain  - prior tx: methotrexate (faituge), enbrel (leukopenia), arava (rash)   - start cosentyx 150 mg subcu q 4 weeks after loading dose- PA submitted today   - prednisone taper provided for flare    Pruritis  - + itching bilateral upper arms. No rash visible. Denies any new soaps, detergents, perfumes, lotions. Has not started any new medications. Chronic bilateral low back pain without sciatica  - + inflammatory features. No red flag symptoms  - xray lumbar spine w/o abnormalities.     - MRI pelvis ordered to evaluate for inflammatory features- scheduled    Dry eyes  - negative SSA, SSB    Medication monitoring   - CBC, CMP, sed rate, CRP q 4-6 months    Encounter for vaccination  - prevnar 13 12/10/2020      No follow-ups on file.         Electronically signed by VIVIANA Cramer - CNP on 5/17/2021 at 3:15 PM    New Prescriptions    No medications on file Thank you for allowing me to participate in the care of this patient. Please call if there are any questions.

## 2021-06-10 DIAGNOSIS — M45.A0 NON-RADIOGRAPHIC AXIAL SPONDYLOARTHRITIS (HCC): ICD-10-CM

## 2021-06-10 RX ORDER — SECUKINUMAB 150 MG/ML
INJECTION SUBCUTANEOUS
Qty: 5 ML | Refills: 0 | Status: SHIPPED | OUTPATIENT
Start: 2021-06-10 | End: 2021-07-06

## 2021-07-06 ENCOUNTER — HOSPITAL ENCOUNTER (OUTPATIENT)
Dept: MRI IMAGING | Age: 51
Discharge: HOME | End: 2021-07-06
Payer: COMMERCIAL

## 2021-07-06 DIAGNOSIS — M47.819 UNDIFFERENTIATED SPONDYLOARTHROPATHY: ICD-10-CM

## 2021-07-06 DIAGNOSIS — M54.50 CHRONIC BILATERAL LOW BACK PAIN WITHOUT SCIATICA: ICD-10-CM

## 2021-07-06 DIAGNOSIS — M45.A0 NON-RADIOGRAPHIC AXIAL SPONDYLOARTHRITIS (HCC): ICD-10-CM

## 2021-07-06 DIAGNOSIS — G89.29 CHRONIC BILATERAL LOW BACK PAIN WITHOUT SCIATICA: ICD-10-CM

## 2021-07-06 PROCEDURE — 6360000004 HC RX CONTRAST MEDICATION: Performed by: NURSE PRACTITIONER

## 2021-07-06 PROCEDURE — 72197 MRI PELVIS W/O & W/DYE: CPT

## 2021-07-06 PROCEDURE — A9579 GAD-BASE MR CONTRAST NOS,1ML: HCPCS | Performed by: NURSE PRACTITIONER

## 2021-07-06 RX ORDER — SECUKINUMAB 150 MG/ML
INJECTION SUBCUTANEOUS
Qty: 5 SYRINGE | Refills: 0 | Status: SHIPPED | OUTPATIENT
Start: 2021-07-06 | End: 2021-08-30 | Stop reason: ALTCHOICE

## 2021-07-06 RX ADMIN — GADOTERIDOL 15 ML: 279.3 INJECTION, SOLUTION INTRAVENOUS at 08:51

## 2021-08-23 ENCOUNTER — TELEPHONE (OUTPATIENT)
Dept: FAMILY MEDICINE CLINIC | Age: 51
End: 2021-08-23

## 2021-08-23 DIAGNOSIS — Z00.00 ROUTINE GENERAL MEDICAL EXAMINATION AT HEALTH CARE FACILITY: Primary | ICD-10-CM

## 2021-08-23 NOTE — TELEPHONE ENCOUNTER
----- Message from Bijal Brooks sent at 8/23/2021  1:25 PM EDT -----  Subject: Message to Provider    QUESTIONS  Information for Provider? patient needs blood work before appointment on   the 30, please place orders so she can get them done on Thursday morning.  ---------------------------------------------------------------------------  --------------  CALL BACK INFO  What is the best way for the office to contact you? OK to leave message on   voicemail  Preferred Call Back Phone Number? [phone]  ---------------------------------------------------------------------------  --------------  SCRIPT ANSWERS  Relationship to Patient?  Self

## 2021-08-26 ENCOUNTER — NURSE ONLY (OUTPATIENT)
Dept: LAB | Age: 51
End: 2021-08-26

## 2021-08-26 DIAGNOSIS — Z00.00 ROUTINE GENERAL MEDICAL EXAMINATION AT HEALTH CARE FACILITY: ICD-10-CM

## 2021-08-26 LAB
ALBUMIN SERPL-MCNC: 4.5 G/DL (ref 3.5–5.1)
ALP BLD-CCNC: 53 U/L (ref 38–126)
ALT SERPL-CCNC: 11 U/L (ref 11–66)
ANION GAP SERPL CALCULATED.3IONS-SCNC: 13 MEQ/L (ref 8–16)
AST SERPL-CCNC: 17 U/L (ref 5–40)
BACTERIA: ABNORMAL
BASOPHILS # BLD: 0.8 %
BASOPHILS ABSOLUTE: 0 THOU/MM3 (ref 0–0.1)
BILIRUB SERPL-MCNC: 1.2 MG/DL (ref 0.3–1.2)
BILIRUBIN URINE: NEGATIVE
BLOOD, URINE: NEGATIVE
BUN BLDV-MCNC: 12 MG/DL (ref 7–22)
CALCIUM SERPL-MCNC: 9.1 MG/DL (ref 8.5–10.5)
CASTS: ABNORMAL /LPF
CASTS: ABNORMAL /LPF
CHARACTER, URINE: ABNORMAL
CHLORIDE BLD-SCNC: 103 MEQ/L (ref 98–111)
CHOLESTEROL, TOTAL: 220 MG/DL (ref 100–199)
CO2: 24 MEQ/L (ref 23–33)
COLOR: YELLOW
CREAT SERPL-MCNC: 0.8 MG/DL (ref 0.4–1.2)
CRYSTALS: ABNORMAL
EOSINOPHIL # BLD: 2.5 %
EOSINOPHILS ABSOLUTE: 0.1 THOU/MM3 (ref 0–0.4)
EPITHELIAL CELLS, UA: ABNORMAL /HPF
ERYTHROCYTE [DISTWIDTH] IN BLOOD BY AUTOMATED COUNT: 12.8 % (ref 11.5–14.5)
ERYTHROCYTE [DISTWIDTH] IN BLOOD BY AUTOMATED COUNT: 45.5 FL (ref 35–45)
GFR SERPL CREATININE-BSD FRML MDRD: 76 ML/MIN/1.73M2
GLUCOSE BLD-MCNC: 77 MG/DL (ref 70–108)
GLUCOSE, URINE: NEGATIVE MG/DL
HCT VFR BLD CALC: 44 % (ref 37–47)
HDLC SERPL-MCNC: 88 MG/DL
HEMOGLOBIN: 14.4 GM/DL (ref 12–16)
IMMATURE GRANS (ABS): 0.01 THOU/MM3 (ref 0–0.07)
IMMATURE GRANULOCYTES: 0.3 %
KETONES, URINE: 40
LDL CHOLESTEROL CALCULATED: 118 MG/DL
LEUKOCYTE ESTERASE, URINE: NEGATIVE
LYMPHOCYTES # BLD: 26.2 %
LYMPHOCYTES ABSOLUTE: 1 THOU/MM3 (ref 1–4.8)
MCH RBC QN AUTO: 31.6 PG (ref 26–33)
MCHC RBC AUTO-ENTMCNC: 32.7 GM/DL (ref 32.2–35.5)
MCV RBC AUTO: 96.7 FL (ref 81–99)
MISCELLANEOUS LAB TEST RESULT: ABNORMAL
MONOCYTES # BLD: 11.7 %
MONOCYTES ABSOLUTE: 0.5 THOU/MM3 (ref 0.4–1.3)
MUCUS: ABNORMAL
NITRITE, URINE: NEGATIVE
NUCLEATED RED BLOOD CELLS: 0 /100 WBC
PH UA: 5.5 (ref 5–9)
PLATELET # BLD: 250 THOU/MM3 (ref 130–400)
PMV BLD AUTO: 10.2 FL (ref 9.4–12.4)
POTASSIUM SERPL-SCNC: 3.9 MEQ/L (ref 3.5–5.2)
PROTEIN UA: NEGATIVE MG/DL
RBC # BLD: 4.55 MILL/MM3 (ref 4.2–5.4)
RBC URINE: ABNORMAL /HPF
RENAL EPITHELIAL, UA: ABNORMAL
SEG NEUTROPHILS: 58.5 %
SEGMENTED NEUTROPHILS ABSOLUTE COUNT: 2.3 THOU/MM3 (ref 1.8–7.7)
SODIUM BLD-SCNC: 140 MEQ/L (ref 135–145)
SPECIFIC GRAVITY UA: 1.02 (ref 1–1.03)
TOTAL PROTEIN: 7.1 G/DL (ref 6.1–8)
TRIGL SERPL-MCNC: 70 MG/DL (ref 0–199)
TSH SERPL DL<=0.05 MIU/L-ACNC: 1.23 UIU/ML (ref 0.4–4.2)
UROBILINOGEN, URINE: 1 EU/DL (ref 0–1)
VITAMIN D 25-HYDROXY: 71 NG/ML (ref 30–100)
WBC # BLD: 3.9 THOU/MM3 (ref 4.8–10.8)
WBC UA: ABNORMAL /HPF
YEAST: ABNORMAL

## 2021-08-30 ENCOUNTER — OFFICE VISIT (OUTPATIENT)
Dept: FAMILY MEDICINE CLINIC | Age: 51
End: 2021-08-30
Payer: COMMERCIAL

## 2021-08-30 VITALS
OXYGEN SATURATION: 97 % | BODY MASS INDEX: 27.89 KG/M2 | HEIGHT: 63 IN | DIASTOLIC BLOOD PRESSURE: 71 MMHG | HEART RATE: 76 BPM | TEMPERATURE: 97.9 F | SYSTOLIC BLOOD PRESSURE: 117 MMHG | WEIGHT: 157.4 LBS

## 2021-08-30 DIAGNOSIS — Z12.11 COLON CANCER SCREENING: ICD-10-CM

## 2021-08-30 DIAGNOSIS — Z00.00 WELL ADULT EXAM: Primary | ICD-10-CM

## 2021-08-30 PROCEDURE — 99396 PREV VISIT EST AGE 40-64: CPT | Performed by: FAMILY MEDICINE

## 2021-08-30 SDOH — ECONOMIC STABILITY: FOOD INSECURITY: WITHIN THE PAST 12 MONTHS, THE FOOD YOU BOUGHT JUST DIDN'T LAST AND YOU DIDN'T HAVE MONEY TO GET MORE.: NEVER TRUE

## 2021-08-30 SDOH — ECONOMIC STABILITY: FOOD INSECURITY: WITHIN THE PAST 12 MONTHS, YOU WORRIED THAT YOUR FOOD WOULD RUN OUT BEFORE YOU GOT MONEY TO BUY MORE.: NEVER TRUE

## 2021-08-30 ASSESSMENT — PATIENT HEALTH QUESTIONNAIRE - PHQ9
SUM OF ALL RESPONSES TO PHQ QUESTIONS 1-9: 0
2. FEELING DOWN, DEPRESSED OR HOPELESS: 0
SUM OF ALL RESPONSES TO PHQ9 QUESTIONS 1 & 2: 0
1. LITTLE INTEREST OR PLEASURE IN DOING THINGS: 0

## 2021-08-30 ASSESSMENT — SOCIAL DETERMINANTS OF HEALTH (SDOH): HOW HARD IS IT FOR YOU TO PAY FOR THE VERY BASICS LIKE FOOD, HOUSING, MEDICAL CARE, AND HEATING?: NOT HARD AT ALL

## 2021-08-30 NOTE — PROGRESS NOTES
[address] [facility] [address], [address] [facility]  Ph:   [phone]  Fax: [phone]    Provider:  Dr. Gail Franklin NOTES     HPI:  Merry Schmidt is a 49y.o. year old female, who comes today for an annual wellness visit. Recent lipid profile done August 26, 2021 showed a total cholesterol 220, triglycerides 70, LDL 18 and HDL 88. Past Medical History:   Diagnosis Date    Undifferentiated spondyloarthropathy 2015       Past Surgical History:   Procedure Laterality Date    BREAST ENHANCEMENT SURGERY      BREAST SURGERY  0823    Silicone implants    ENDOMETRIAL ABLATION  2006    heavy periods       Family History   Problem Relation Age of Onset    Mult Sclerosis Mother 27    Diabetes Father 72    Heart Disease Father 48        MI at 48    Lung Cancer Father [de-identified]    Diabetes Paternal Grandfather        Social History     Socioeconomic History    Marital status:      Spouse name: Carlota Rmoero Number of children: 2    Years of education: 16    Highest education level: Not on file   Occupational History     Employer: [employer]     Comment: [employer], [employer]   Tobacco Use    Smoking status: Never Smoker    Smokeless tobacco: Never Used   Substance and Sexual Activity    Alcohol use: Yes     Alcohol/week: 5.0 standard drinks     Types: 3 Glasses of wine, 2 Cans of beer per week     Comment: Occasionally    Drug use: No    Sexual activity: Yes     Partners: Male   Other Topics Concern    Not on file   Social History Narrative    Not on file     Social Determinants of Health     Financial Resource Strain: Low Risk     Difficulty of Paying Living Expenses: Not hard at all   Food Insecurity: No Food Insecurity    Worried About Running Out of Food in the Last Year: Never true    [address] in the Last Year: Never true   Transportation Needs:     Lack of Transportation (Medical):      Lack of Transportation (Non-Medical):    Physical Activity:  Days of Exercise per Week:     Minutes of Exercise per Session:    Stress:     Feeling of Stress :    Social Connections:     Frequency of Communication with Friends and Family:     Frequency of Social Gatherings with Friends and Family:     Attends Spiritism Services:     Active Member of Clubs or Organizations:     Attends Club or Organization Meetings:     Marital Status:    Intimate Partner Violence:     Fear of Current or Ex-Partner:     Emotionally Abused:     Physically Abused:     Sexually Abused: Allergies   Allergen Reactions    Sulfa Antibiotics Anaphylaxis       [facility] has a current medication list which includes the following prescription(s): naproxen and cosentyx. Objective:  Blood pressure 117/71, pulse 76, temperature 97.9 °F (36.6 °C), temperature source Oral, height 5' 3\" (1.6 m), weight 157 lb 6.4 oz (71.4 kg), SpO2 97 %. Physical Examination:   General Appearance - alert, well appearing, and in no distress and oriented to person, place, and time  Eyes - pupils equal and reactive, extraocular eye movements intact, sclera anicteric  Ears - bilateral TM's and external ear canals normal, hearing grossly normal bilaterally  Nose - normal and patent, no erythema, discharge or polyps  Mouth - mucous membranes moist, pharynx normal without lesions  Neck - supple, no significant adenopathy  Lymphatics - no palpable lymphadenopathy  Chest - clear to auscultation, no wheezes, rales or rhonchi, symmetric air entry  Heart - normal rate, regular rhythm, normal S1, S2, no murmurs, rubs, clicks or gallops  Abdomen - soft, nontender, nondistended, no masses or organomegaly  Neurological -  oriented, normal speech, no focal findings or movement disorder noted  Extremities - peripheral pulses normal, no pedal edema, no clubbing or cyanosis  Skin - normal coloration and turgor, no rashes, no suspicious skin lesions noted    Assessment:   Diagnosis Orders   1. Well adult exam     2. Colon cancer screening  LEON - Philip Oliveira MD, Gastroenterology, [facility]:    Return in about 4 weeks (around 9/27/2021). .    Counseling was provided today regarding the following topics:  Healthy eating habits and snacks, talking or texting while driving, vitamin/Mineral supplementation, vitamin D and calcium intake, regular exercise, and breast self exam. Written information has been provided.          Tommy Ramirez MD

## 2021-09-01 ENCOUNTER — OFFICE VISIT (OUTPATIENT)
Dept: RHEUMATOLOGY | Age: 51
End: 2021-09-01
Payer: COMMERCIAL

## 2021-09-01 VITALS
DIASTOLIC BLOOD PRESSURE: 60 MMHG | SYSTOLIC BLOOD PRESSURE: 90 MMHG | BODY MASS INDEX: 27.89 KG/M2 | HEIGHT: 63 IN | HEART RATE: 74 BPM | WEIGHT: 157.41 LBS | OXYGEN SATURATION: 96 %

## 2021-09-01 DIAGNOSIS — M45.A0 NON-RADIOGRAPHIC AXIAL SPONDYLOARTHRITIS (HCC): Primary | ICD-10-CM

## 2021-09-01 DIAGNOSIS — D72.819 LEUKOPENIA, UNSPECIFIED TYPE: ICD-10-CM

## 2021-09-01 DIAGNOSIS — Z23 IMMUNIZATION DUE: ICD-10-CM

## 2021-09-01 DIAGNOSIS — Z23 ENCOUNTER FOR VACCINATION: ICD-10-CM

## 2021-09-01 DIAGNOSIS — G89.29 CHRONIC BILATERAL LOW BACK PAIN WITHOUT SCIATICA: ICD-10-CM

## 2021-09-01 DIAGNOSIS — Z51.81 MEDICATION MONITORING ENCOUNTER: ICD-10-CM

## 2021-09-01 DIAGNOSIS — M54.50 CHRONIC BILATERAL LOW BACK PAIN WITHOUT SCIATICA: ICD-10-CM

## 2021-09-01 PROCEDURE — G8419 CALC BMI OUT NRM PARAM NOF/U: HCPCS | Performed by: INTERNAL MEDICINE

## 2021-09-01 PROCEDURE — 3017F COLORECTAL CA SCREEN DOC REV: CPT | Performed by: INTERNAL MEDICINE

## 2021-09-01 PROCEDURE — 99214 OFFICE O/P EST MOD 30 MIN: CPT | Performed by: INTERNAL MEDICINE

## 2021-09-01 PROCEDURE — G8427 DOCREV CUR MEDS BY ELIG CLIN: HCPCS | Performed by: INTERNAL MEDICINE

## 2021-09-01 PROCEDURE — 1036F TOBACCO NON-USER: CPT | Performed by: INTERNAL MEDICINE

## 2021-09-01 ASSESSMENT — ENCOUNTER SYMPTOMS
CONSTIPATION: 0
NAUSEA: 0
DIARRHEA: 0
EYE PAIN: 0
VOMITING: 0
EYES NEGATIVE: 1
SHORTNESS OF BREATH: 0
COUGH: 0
WHEEZING: 0
EYE REDNESS: 0

## 2021-09-01 NOTE — PROGRESS NOTES
history of Undifferentiated spondyloarthropathy. SOCIAL HISTORY     reports that she has never smoked. She has never used smokeless tobacco. She reports current alcohol use of about 5.0 standard drinks of alcohol per week. She reports that she does not use drugs. ALLERGIES     Allergies   Allergen Reactions    Sulfa Antibiotics Anaphylaxis       CURRENT MEDICATIONS      Current Outpatient Medications:     naproxen (NAPROSYN) 250 MG tablet, Take 250 mg by mouth 2 times daily as needed for Pain, Disp: , Rfl:     secukinumab (COSENTYX) 150 MG/ML SOSY, Inject 150 mg subcutaneously every 4 weeks (start after loading dose), Disp: 1 mL, Rfl: 0    PHYSICAL EXAMINATION / OBJECTIVE     Objective:  BP 90/60 (Site: Left Upper Arm, Position: Sitting, Cuff Size: Medium Adult)   Pulse 74   Ht 5' 2.99\" (1.6 m)   Wt 157 lb 6.5 oz (71.4 kg)   SpO2 96%   BMI 27.89 kg/m²     Physical Exam      General Appearance:  AAO x 3 ,  well-developed and well nourished  Head: NCAT  Eyes: No abnormalities. ,  Sclera non-icteric,   Ears / Nose:  normal  appearance  ears and nose. No active drainage   Mouth:  MMM, ears w/o deformities  Neck: No jugular venous distention, appears symmetric, good ROM  Lymph:  no cervical adenopathy    Pulmonary/Chest: CTA bilateral ,  symmetric chest expansion. Cardiovascular: Normal S1 and S2, NO murmur, rub, gallop  : Deferred   Abd/GI: Deferred   Neurologic: Speech normal, no facial droop,  Skin: NO rash on exposed skin. Musculoskeletal:  Upper extremities:   Shoulders:  Nontender, no swelling,   Elbows:      Non-tender   Wrists        Nontender,   Hands:      + tender right 3,4 MCPs, PIPs -  Tender right 2,3 left 4th,.  Fullness left # 4,      Lower extremities:  Hips:       + tender bilat  Knees :   Nontender, no swelling  Ankles:   tender right   Feet:       + MTP compression bilat, tender left posterior heels,      Spine:     + tender lumbosacral spine       Exam KEY:   Tender : T Swelling: S,   Deformities: D,   Non-tender : NT  ,  No swelling: NS         RAPID 3  9/1/2021 --- RAPID 3:  0.7 + 3 + 2 = 5.7       Remission: <3  Low Disease Activity: <6  Moderate Disease Activity: >=6 and <=12  High Disease Activity: >12     LABS      Lab Results   Component Value Date    WBC 3.9 (L) 08/26/2021    HGB 14.4 08/26/2021    MCV 96.7 08/26/2021    MCHC 32.7 08/26/2021    RDW 13.3 12/19/2017     08/26/2021    NEUTROABS 1740 (L) 09/19/2016    LYMPHSABS 1.0 08/26/2021    EOSABS 0.1 08/26/2021    BASOSABS 0.0 08/26/2021         Chemistry        Component Value Date/Time     08/26/2021 0939    K 3.9 08/26/2021 0939     08/26/2021 0939    CO2 24 08/26/2021 0939    BUN 12 08/26/2021 0939    CREATININE 0.8 08/26/2021 0939        Component Value Date/Time    CALCIUM 9.1 08/26/2021 0939    ALKPHOS 53 08/26/2021 0939    AST 17 08/26/2021 0939    ALT 11 08/26/2021 0939    BILITOT 1.2 08/26/2021 0939            Lab Results   Component Value Date    SEDRATE 14 12/04/2020    SEDRATE 10 10/29/2020    SEDRATE 10 08/25/2020    CRP 0.26 12/04/2020    CRP 0.23 10/29/2020    CRP 0.77 08/25/2020       Lab Results   Component Value Date    VITD25 71 08/26/2021       No results found for: C3, C4]    Lab Results   Component Value Date    ANASCRN None Detected 01/03/2019     Lab Results   Component Value Date    SSA SEE BELOW 12/04/2020     Lab Results   Component Value Date    SSB 1 12/04/2020     No results found for: ANTI-SMITH  No results found for: DSDNAAB   No results found for: ANTIRNP  No results found for: C3, C4  Lab Results   Component Value Date    CCPAB 5 07/19/2019     Lab Results   Component Value Date    RF < 10 01/03/2019           RADIOLOGY / PROCEDURES:     ASSESSMENT/PLAN:     No diagnosis found. Non radiographic axial spondyloarthropathy  - synovitis, enthesitis, chronic low back pain. MRI w/o sacroliitis.    - prior tx: methotrexate (faituge), enbrel (leukopenia), arava (rash) - cosentyx 150 mg subcu q 4 weeks after loading dose (June 2021) -- holding on titration    Leukopenia - active - ? Medication related. - re-evaluation in 2 months     Pruritis - improved  - diagnosed with photosensitivity. Chronic bilateral low back pain without sciatica  - + inflammatory features. No red flag symptoms  - xray lumbar spine w/o abnormalities. - MRI pelvis w/o abnoramlities. Dry eyes - negative SSA, SSB -- ? If related to # 1. Medication monitoring               - CBC, CMP, sed rate, CRP q 4-6 months     Encounter for vaccination   - prevnar 13 12/10/2020   - ppv 23 -booster today    No follow-ups on file. New Prescriptions    No medications on file       9/1/2021    Electronically signed by Alicia Lee DO on 9/1/21 at 11:49 AM EDT  Please contact the office if you have any questions or change of symptoms.

## 2021-09-01 NOTE — PROGRESS NOTES
Immunizations Administered     Name Date Dose Route    Pneumococcal Polysaccharide ([id number]) 9/1/2021 0.5 mL Intramuscular    Lot: [id number]    NDC: 3514-4690-82          Injection given by Florentin Landaverde CMA ([facility]) without complaint in her RT deltoid     Patient tolerated well

## 2021-09-27 ENCOUNTER — OFFICE VISIT (OUTPATIENT)
Dept: FAMILY MEDICINE CLINIC | Age: 51
End: 2021-09-27
Payer: COMMERCIAL

## 2021-09-27 VITALS
TEMPERATURE: 97.2 F | BODY MASS INDEX: 26.79 KG/M2 | DIASTOLIC BLOOD PRESSURE: 72 MMHG | HEART RATE: 76 BPM | HEIGHT: 63 IN | RESPIRATION RATE: 14 BRPM | SYSTOLIC BLOOD PRESSURE: 106 MMHG | WEIGHT: 151.2 LBS

## 2021-09-27 DIAGNOSIS — Z23 INFLUENZA VACCINE ADMINISTERED: ICD-10-CM

## 2021-09-27 DIAGNOSIS — Z71.89 COUNSELING FOR HORMONE REPLACEMENT THERAPY: ICD-10-CM

## 2021-09-27 DIAGNOSIS — N95.1 PERIMENOPAUSAL SYMPTOMS: Primary | ICD-10-CM

## 2021-09-27 PROCEDURE — 90674 CCIIV4 VAC NO PRSV 0.5 ML IM: CPT | Performed by: FAMILY MEDICINE

## 2021-09-27 PROCEDURE — G8419 CALC BMI OUT NRM PARAM NOF/U: HCPCS | Performed by: FAMILY MEDICINE

## 2021-09-27 PROCEDURE — 1036F TOBACCO NON-USER: CPT | Performed by: FAMILY MEDICINE

## 2021-09-27 PROCEDURE — G8427 DOCREV CUR MEDS BY ELIG CLIN: HCPCS | Performed by: FAMILY MEDICINE

## 2021-09-27 PROCEDURE — 3017F COLORECTAL CA SCREEN DOC REV: CPT | Performed by: FAMILY MEDICINE

## 2021-09-27 PROCEDURE — 99212 OFFICE O/P EST SF 10 MIN: CPT | Performed by: FAMILY MEDICINE

## 2021-09-27 PROCEDURE — 90471 IMMUNIZATION ADMIN: CPT | Performed by: FAMILY MEDICINE

## 2021-09-27 RX ORDER — NORETHINDRONE ACETATE AND ETHINYL ESTRADIOL 1; 5 MG/1; UG/1
1 TABLET ORAL DAILY
COMMUNITY
End: 2022-08-17

## 2021-09-27 NOTE — PROGRESS NOTES
[address] [place]  [address] [place], [address]  Ph:   [phone]  Fax: [phone]    Provider:  Dr. Diana Wheeler    Patient:  Bradly Mcqueen  YOB: 1970      Visit Date:  9/27/2021     Reason For Visit:   Chief Complaint   Patient presents with   Thang Edge is a 49 y.o. female who comes today to the office for follow up HRT      Since I saw her last time she has seen her Gyn and started HRT. She was started on Norethindrone-ethynyl estradiol 1 tablet daily. She feels much better, her mood is improved, she is sleeping much better,     She saw her rheumatologist, Dr. Viktor Juan, he continued Cosentyx 150 mg SC injection monthly. He also gave her Pneumovax Septemebr 1, 2021      Significant Past Medical History:    Past Medical History:   Diagnosis Date    Undifferentiated spondyloarthropathy 2015           Allergies:  is allergic to sulfa antibiotics. Medications:   Current Outpatient Medications   Medication Sig Dispense Refill    norethindrone-ethinyl estradiol (FYAVOLV) 1-5 MG-MCG TABS per tablet Take 1 tablet by mouth daily      naproxen (NAPROSYN) 250 MG tablet Take 250 mg by mouth 2 times daily as needed for Pain      secukinumab (COSENTYX) 150 MG/ML SOSY Inject 150 mg subcutaneously every 4 weeks (start after loading dose) 1 mL 0     No current facility-administered medications for this visit. Physical Examination:  /72 (Site: Left Upper Arm, Position: Sitting, Cuff Size: Large Adult)   Pulse 76   Temp 97.2 °F (36.2 °C) (Temporal)   Resp 14   Ht 5' 3\" (1.6 m)   Wt 151 lb 3.2 oz (68.6 kg)   BMI 26.78 kg/m²     General-  Alert and oriented x 3, NAD  PE deferred    Impression:   Diagnosis Orders   1. Perimenopausal symptoms     2. Counseling for hormone replacement therapy     3.  Influenza vaccine administered  INFLUENZA, MDCK QUADV, 2 YRS AND OLDER, IM, PF, PREFILL SYR OR SDV, 0.5ML (FLUCELVAX QUADV, PF)       Plan:  Calcium 1200 mg PO daily  Vitamin D 1000 mg PO daily  MVI 1 PO daily    Orders Placed This Encounter   Procedures    INFLUENZA, MDCK QUADV, 2 YRS AND OLDER, IM, PF, PREFILL SYR OR SDV, 0.5ML (FLUCELVAX QUADV, PF)     No orders of the defined types were placed in this encounter. Follow Up:  Return in about 6 months (around 3/27/2022).     Magdalena Anderson MD

## 2021-09-27 NOTE — PROGRESS NOTES
Immunizations Administered     Name Date Dose Route    Influenza, MDCK Quadv, IM, PF (Flucelvax 2 yrs and older) 9/27/2021 0.5 mL Intramuscular    Site: Deltoid- Left    Lot: [id number]    NDC: 51440-073-22      Patient tolerated well. No adverse reaction noted at this time.

## 2021-11-10 DIAGNOSIS — M45.A0 NON-RADIOGRAPHIC AXIAL SPONDYLOARTHRITIS (HCC): ICD-10-CM

## 2021-11-11 RX ORDER — SECUKINUMAB 150 MG/ML
INJECTION SUBCUTANEOUS
Qty: 1.96 ML | Refills: 4 | Status: SHIPPED | OUTPATIENT
Start: 2021-11-11 | End: 2022-08-02

## 2021-12-07 ENCOUNTER — HOSPITAL ENCOUNTER (OUTPATIENT)
Age: 51
Discharge: HOME | End: 2021-12-07
Payer: COMMERCIAL

## 2021-12-07 DIAGNOSIS — D72.819 LEUKOPENIA, UNSPECIFIED TYPE: ICD-10-CM

## 2021-12-07 LAB
BASOPHILS # BLD: 1.1 %
BASOPHILS ABSOLUTE: 0.1 THOU/MM3 (ref 0–0.1)
EOSINOPHIL # BLD: 2.3 %
EOSINOPHILS ABSOLUTE: 0.1 THOU/MM3 (ref 0–0.4)
ERYTHROCYTE [DISTWIDTH] IN BLOOD BY AUTOMATED COUNT: 12 % (ref 11.5–14.5)
ERYTHROCYTE [DISTWIDTH] IN BLOOD BY AUTOMATED COUNT: 42 FL (ref 35–45)
HCT VFR BLD CALC: 42.1 % (ref 37–47)
HEMOGLOBIN: 14.2 GM/DL (ref 12–16)
IMMATURE GRANS (ABS): 0.02 THOU/MM3 (ref 0–0.07)
IMMATURE GRANULOCYTES: 0.4 %
LYMPHOCYTES # BLD: 25.6 %
LYMPHOCYTES ABSOLUTE: 1.3 THOU/MM3 (ref 1–4.8)
MCH RBC QN AUTO: 31.8 PG (ref 26–33)
MCHC RBC AUTO-ENTMCNC: 33.7 GM/DL (ref 32.2–35.5)
MCV RBC AUTO: 94.2 FL (ref 81–99)
MONOCYTES # BLD: 8.8 %
MONOCYTES ABSOLUTE: 0.5 THOU/MM3 (ref 0.4–1.3)
NUCLEATED RED BLOOD CELLS: 0 /100 WBC
PLATELET # BLD: 232 THOU/MM3 (ref 130–400)
PMV BLD AUTO: 9.1 FL (ref 9.4–12.4)
RBC # BLD: 4.47 MILL/MM3 (ref 4.2–5.4)
SEG NEUTROPHILS: 61.8 %
SEGMENTED NEUTROPHILS ABSOLUTE COUNT: 3.2 THOU/MM3 (ref 1.8–7.7)
WBC # BLD: 5.2 THOU/MM3 (ref 4.8–10.8)

## 2021-12-07 PROCEDURE — 36415 COLL VENOUS BLD VENIPUNCTURE: CPT

## 2021-12-07 PROCEDURE — 85025 COMPLETE CBC W/AUTO DIFF WBC: CPT

## 2021-12-08 ENCOUNTER — OFFICE VISIT (OUTPATIENT)
Dept: RHEUMATOLOGY | Age: 51
End: 2021-12-08
Payer: COMMERCIAL

## 2021-12-08 VITALS
OXYGEN SATURATION: 97 % | SYSTOLIC BLOOD PRESSURE: 108 MMHG | BODY MASS INDEX: 27.71 KG/M2 | HEART RATE: 76 BPM | WEIGHT: 156.4 LBS | HEIGHT: 63 IN | DIASTOLIC BLOOD PRESSURE: 72 MMHG

## 2021-12-08 DIAGNOSIS — Z51.81 MEDICATION MONITORING ENCOUNTER: ICD-10-CM

## 2021-12-08 DIAGNOSIS — M54.50 CHRONIC BILATERAL LOW BACK PAIN WITHOUT SCIATICA: ICD-10-CM

## 2021-12-08 DIAGNOSIS — D72.819 LEUKOPENIA, UNSPECIFIED TYPE: ICD-10-CM

## 2021-12-08 DIAGNOSIS — G89.29 CHRONIC BILATERAL LOW BACK PAIN WITHOUT SCIATICA: ICD-10-CM

## 2021-12-08 DIAGNOSIS — M45.A0 NON-RADIOGRAPHIC AXIAL SPONDYLOARTHRITIS (HCC): Primary | ICD-10-CM

## 2021-12-08 PROCEDURE — 99214 OFFICE O/P EST MOD 30 MIN: CPT | Performed by: NURSE PRACTITIONER

## 2021-12-08 PROCEDURE — G8419 CALC BMI OUT NRM PARAM NOF/U: HCPCS | Performed by: NURSE PRACTITIONER

## 2021-12-08 PROCEDURE — G8427 DOCREV CUR MEDS BY ELIG CLIN: HCPCS | Performed by: NURSE PRACTITIONER

## 2021-12-08 PROCEDURE — 3017F COLORECTAL CA SCREEN DOC REV: CPT | Performed by: NURSE PRACTITIONER

## 2021-12-08 PROCEDURE — G8482 FLU IMMUNIZE ORDER/ADMIN: HCPCS | Performed by: NURSE PRACTITIONER

## 2021-12-08 PROCEDURE — 1036F TOBACCO NON-USER: CPT | Performed by: NURSE PRACTITIONER

## 2021-12-08 ASSESSMENT — ENCOUNTER SYMPTOMS
CONSTIPATION: 0
SHORTNESS OF BREATH: 0
COUGH: 0
BACK PAIN: 1
EYE ITCHING: 0
EYE PAIN: 0
ABDOMINAL PAIN: 0
NAUSEA: 0
TROUBLE SWALLOWING: 0
DIARRHEA: 0

## 2021-12-08 NOTE — PROGRESS NOTES
[facility] RHEUMATOLOGY FOLLOW UP NOTE       Date Of Service: 12/8/2021  Provider: Baylee Griffith APRN - CNP    Name: Frank Gee   MRN: [id number]    Chief Complaint(s)     Chief Complaint   Patient presents with    3 Month Follow-Up        History of Present Illness (HPI)     Frank Gee  is a(n)51 y.o. female with a hx of undifferentiated spondyloarthropathy here for the f/u evaluation of undifferentiated spondyloarthropathy     Interval hx:    - improvement with cosentyx, but some increased joint pains over the past 2-3 months in ankles and heels. Noticed increased pain 1 week prior to next dose. pain affecting the fingers, right shoulder, hips, ankles, toes, neck, back  Pain on a scale 0-10: 3/10  Type of pain: constant in ankles  Timing: mornings    Aggravating factors: hands: certain foods, hips/knees: sitting for long periods, back: sitting for long periods   Alleviating factors: movement      Associated symptoms:  + swelling/  Redness/ warmth (hands, feet), + AM stiffness lasting ~ 30 minutes    REVIEW OF SYSTEMS: (ROS)    Review of Systems   Constitutional: Positive for fatigue. Negative for fever and unexpected weight change. HENT: Negative for congestion and trouble swallowing. Eyes: Negative for pain and itching. Respiratory: Negative for cough and shortness of breath. Cardiovascular: Negative for chest pain and leg swelling. Gastrointestinal: Negative for abdominal pain, constipation, diarrhea and nausea. Endocrine: Negative for cold intolerance and heat intolerance. Genitourinary: Negative for difficulty urinating, frequency and urgency. Musculoskeletal: Positive for arthralgias, back pain, joint swelling and myalgias. Skin: Positive for rash. Neurological: Positive for headaches. Negative for dizziness, weakness and numbness. Psychiatric/Behavioral: Positive for sleep disturbance. The patient is not nervous/anxious.         PAST MEDICAL HISTORY Past Medical History:   Diagnosis Date    Undifferentiated spondyloarthropathy 2015       PAST SURGICAL HISTORY      Past Surgical History:   Procedure Laterality Date    BREAST ENHANCEMENT SURGERY      BREAST SURGERY  7640    Silicone implants    ENDOMETRIAL ABLATION  2006    heavy periods       FAMILY HISTORY      Family History   Problem Relation Age of Onset    Mult Sclerosis Mother 27    Diabetes Father 72    Heart Disease Father 48        MI at 48    Lung Cancer Father [de-identified]    Diabetes Paternal Grandfather        SOCIAL HISTORY      Social History     Tobacco History     Smoking Status  Never Smoker    Smokeless Tobacco Use  Never Used          Alcohol History     Alcohol Use Status  Yes Drinks/Week  3 Glasses of wine, 2 Cans of beer per week Amount  5.0 standard drinks of alcohol/wk Comment  Occasionally          Drug Use     Drug Use Status  No          Sexual Activity     Sexually Active  Yes Partners  Male                ALLERGIES     Allergies   Allergen Reactions    Sulfa Antibiotics Anaphylaxis       CURRENT MEDICATIONS      Current Outpatient Medications   Medication Sig Dispense Refill    secukinumab (COSENTYX) 150 MG/ML SOSY INJECT ONE SYRINGE SUBCUTANEOUSLY  EVERY 4 WEEKS. REFRIGERATE. 1.96 mL 4    norethindrone-ethinyl estradiol (FYAVOLV) 1-5 MG-MCG TABS per tablet Take 1 tablet by mouth daily      naproxen (NAPROSYN) 250 MG tablet Take 250 mg by mouth 2 times daily as needed for Pain       No current facility-administered medications for this visit. PHYSICAL EXAMINATION / OBJECTIVE   Objective: There were no vitals taken for this visit. Physical Exam  Vitals reviewed. Constitutional:       Appearance: She is well-developed. Cardiovascular:      Rate and Rhythm: Normal rate and regular rhythm. Pulmonary:      Effort: Pulmonary effort is normal.      Breath sounds: Normal breath sounds. Abdominal:      Palpations: Abdomen is soft. Tenderness:  There is no abdominal tenderness. Musculoskeletal:      Cervical back: Normal range of motion and neck supple. Skin:     General: Skin is warm and dry. Findings: No rash. Neurological:      Mental Status: She is alert and oriented to person, place, and time. Deep Tendon Reflexes: Reflexes are normal and symmetric. Psychiatric:         Thought Content:  Thought content normal.       Upper extremities:   Shoulders:  Nontender, no swelling,   Elbows:      nontender  Wrists        Nontender, no swelling,   Hands:      MCPs tender right 3,4   PIPs tender right 3,4    Lower extremities:  Hips:       + tender bilat  Knees :   Nontender, no swelling  Ankles:   + tender bilat, no swelling,   Feet:       + MTP compression bilat, tender bilat posterior heels    Spine:     + tender lumbar spine        RAPID 3:   12/8/2021 --- RAPID 3: 0.7 + 3 + 2 = 5.7     Remission: <3  Low Disease Activity: <6  Moderate Disease Activity: >=6 and <=12  High Disease Activity: >12     LABS    CBC  Lab Results   Component Value Date    WBC 5.2 12/07/2021    RBC 4.47 12/07/2021    RBC 4.34 04/23/2012    HGB 14.2 12/07/2021    HCT 42.1 12/07/2021    MCV 94.2 12/07/2021    MCH 31.8 12/07/2021    MCHC 33.7 12/07/2021    RDW 13.3 12/19/2017     12/07/2021       CMP  Lab Results   Component Value Date    CALCIUM 9.1 08/26/2021    LABALBU 4.5 08/26/2021    PROT 7.1 08/26/2021     08/26/2021    K 3.9 08/26/2021    CO2 24 08/26/2021     08/26/2021    BUN 12 08/26/2021    CREATININE 0.8 08/26/2021    ALKPHOS 53 08/26/2021    ALT 11 08/26/2021    AST 17 08/26/2021       HgBA1c: No components found for: HGBA1C    Lab Results   Component Value Date    VITD25 71 08/26/2021         Lab Results   Component Value Date    ANASCRN None Detected 01/03/2019     Lab Results   Component Value Date    SSA SEE BELOW 12/04/2020     Lab Results   Component Value Date    SSB 1 12/04/2020     No results found for: ANTI-SMITH  No results found for: DSDNAAB   No results found for: ANTIRNP  No results found for: C3, C4  Lab Results   Component Value Date    CCPAB 5 07/19/2019     Lab Results   Component Value Date    RF < 10 01/03/2019       No components found for: CANCASCRN, APANCASCRN  Lab Results   Component Value Date    SEDRATE 14 12/04/2020     Lab Results   Component Value Date    CRP 0.26 12/04/2020       RADIOLOGY:         ASSESSMENT/PLAN    Assessment   Plan     Undiff spondyloarthritis  Non radiographic axial spondyloarthropathy  - synovitis, enthesitis, chronic low back pain  - prior tx: methotrexate (faituge), enbrel (leukopenia), arava (rash)   - increase cosentyx to 300 mg subcu q 4 weeks (June 2021)    Leukopenia- resolved on recent evaluation    Chronic bilateral low back pain without sciatica  - + inflammatory features. No red flag symptoms  - xray lumbar spine w/o abnormalities. MRI pelvis w/o sacroiliitis. Dry eyes  - negative SSA, SSB- ? Related to #1    Medication monitoring   - CBC, CMP, sed rate, CRP q 4 months      No follow-ups on file. Electronically signed by VIVIANA Maher CNP on 12/8/2021 at 11:35 AM    New Prescriptions    No medications on file       Thank you for allowing me to participate in the care of this patient. Please call if there are any questions.

## 2022-01-10 ENCOUNTER — TELEPHONE (OUTPATIENT)
Dept: RHEUMATOLOGY | Age: 52
End: 2022-01-10

## 2022-01-10 NOTE — TELEPHONE ENCOUNTER
FYI, pt's updated insurance was scanned into her chart. States that she now needs a  Prior authorization on the Cosentyx.

## 2022-01-11 ENCOUNTER — TELEPHONE (OUTPATIENT)
Dept: RHEUMATOLOGY | Age: 52
End: 2022-01-11

## 2022-03-23 ENCOUNTER — HOSPITAL ENCOUNTER (OUTPATIENT)
Dept: WOMENS IMAGING | Age: 52
Discharge: HOME | End: 2022-03-23
Payer: COMMERCIAL

## 2022-03-23 DIAGNOSIS — Z12.31 VISIT FOR SCREENING MAMMOGRAM: ICD-10-CM

## 2022-03-23 PROCEDURE — 77063 BREAST TOMOSYNTHESIS BI: CPT

## 2022-05-17 ENCOUNTER — HOSPITAL ENCOUNTER (OUTPATIENT)
Age: 52
End: 2022-05-17

## 2022-05-17 ENCOUNTER — OFFICE VISIT (OUTPATIENT)
Dept: RHEUMATOLOGY | Age: 52
End: 2022-05-17
Payer: COMMERCIAL

## 2022-05-17 VITALS
HEART RATE: 86 BPM | OXYGEN SATURATION: 99 % | HEIGHT: 63 IN | DIASTOLIC BLOOD PRESSURE: 65 MMHG | BODY MASS INDEX: 28.46 KG/M2 | SYSTOLIC BLOOD PRESSURE: 132 MMHG | WEIGHT: 160.6 LBS

## 2022-05-17 DIAGNOSIS — Z51.81 MEDICATION MONITORING ENCOUNTER: ICD-10-CM

## 2022-05-17 DIAGNOSIS — G89.29 CHRONIC BILATERAL LOW BACK PAIN WITHOUT SCIATICA: ICD-10-CM

## 2022-05-17 DIAGNOSIS — M47.819 UNDIFFERENTIATED SPONDYLOARTHROPATHY: ICD-10-CM

## 2022-05-17 DIAGNOSIS — M45.A0 NON-RADIOGRAPHIC AXIAL SPONDYLOARTHRITIS (HCC): Primary | ICD-10-CM

## 2022-05-17 DIAGNOSIS — M54.50 CHRONIC BILATERAL LOW BACK PAIN WITHOUT SCIATICA: ICD-10-CM

## 2022-05-17 PROCEDURE — 1036F TOBACCO NON-USER: CPT | Performed by: NURSE PRACTITIONER

## 2022-05-17 PROCEDURE — G8419 CALC BMI OUT NRM PARAM NOF/U: HCPCS | Performed by: NURSE PRACTITIONER

## 2022-05-17 PROCEDURE — 3017F COLORECTAL CA SCREEN DOC REV: CPT | Performed by: NURSE PRACTITIONER

## 2022-05-17 PROCEDURE — G8427 DOCREV CUR MEDS BY ELIG CLIN: HCPCS | Performed by: NURSE PRACTITIONER

## 2022-05-17 PROCEDURE — 99214 OFFICE O/P EST MOD 30 MIN: CPT | Performed by: NURSE PRACTITIONER

## 2022-05-17 ASSESSMENT — ENCOUNTER SYMPTOMS
EYE ITCHING: 0
BACK PAIN: 1
COUGH: 0
ABDOMINAL PAIN: 0
TROUBLE SWALLOWING: 0
SHORTNESS OF BREATH: 0
CONSTIPATION: 0
NAUSEA: 0
EYE PAIN: 0
DIARRHEA: 0

## 2022-05-17 NOTE — PROGRESS NOTES
[facility] RHEUMATOLOGY FOLLOW UP NOTE       Date Of Service: 5/17/2022  Provider: VIVIANA Kumar - CNP    Name: Екатерина Byrnes   MRN: [id number]    Chief Complaint(s)     Chief Complaint   Patient presents with    Follow-up     restart consetyx discuss current symptoms        History of Present Illness (HPI)     Еактерина Byrnes  is a(n)52 y.o. female with a hx of undifferentiated spondyloarthropathy here for the f/u evaluation of undifferentiated spondyloarthropathy     Interval hx:    - had COVID with persistent cough starting end of January- has been off cosentyx since then with increased joint pains. - saw dermatology- has spot biopsied on chest- [address]    pain affecting the wrists, right fingers, right elbow, hips, right knee, ankles, right toes,neck, back  Pain on a scale 0-10: 5/10  Type of pain: constant in ankles  Timing: mornings    Aggravating factors: hands: certain foods, hips/knees: sitting for long periods, back: sitting for long periods   Alleviating factors: movement      Associated symptoms:  + swelling/  Redness/ warmth (right foot, posterior heel, hands), + AM stiffness lasting ~ 45 minutes      REVIEW OF SYSTEMS: (ROS)    Review of Systems   Constitutional: Positive for fatigue. Negative for fever and unexpected weight change. HENT: Negative for congestion and trouble swallowing. Eyes: Negative for pain and itching. Dry eyes   Respiratory: Negative for cough and shortness of breath. Cardiovascular: Negative for chest pain and leg swelling. Gastrointestinal: Negative for abdominal pain, constipation, diarrhea and nausea. Endocrine: Negative for cold intolerance and heat intolerance. Genitourinary: Negative for difficulty urinating, frequency and urgency. Musculoskeletal: Positive for arthralgias, back pain, joint swelling and myalgias. Skin: Positive for rash. Neurological: Positive for headaches.  Negative for dizziness, weakness and numbness. Psychiatric/Behavioral: Positive for sleep disturbance. The patient is not nervous/anxious. PAST MEDICAL HISTORY      Past Medical History:   Diagnosis Date    Undifferentiated spondyloarthropathy 2015       PAST SURGICAL HISTORY      Past Surgical History:   Procedure Laterality Date    BREAST ENHANCEMENT SURGERY  4732    Silicone implants    ENDOMETRIAL ABLATION  2006    heavy periods       FAMILY HISTORY      Family History   Problem Relation Age of Onset    Mult Sclerosis Mother 27    Diabetes Father 72    Heart Disease Father 48        MI at 48    Lung Cancer Father [de-identified]    Diabetes Paternal Grandfather        SOCIAL HISTORY      Social History     Tobacco History     Smoking Status  Never Smoker    Smokeless Tobacco Use  Never Used          Alcohol History     Alcohol Use Status  Yes Drinks/Week  3 Glasses of wine, 2 Cans of beer per week Amount  5.0 standard drinks of alcohol/wk Comment  Occasionally          Drug Use     Drug Use Status  No          Sexual Activity     Sexually Active  Yes Partners  Male                ALLERGIES     Allergies   Allergen Reactions    Sulfa Antibiotics Anaphylaxis       CURRENT MEDICATIONS      Current Outpatient Medications   Medication Sig Dispense Refill    naproxen (NAPROSYN) 250 MG tablet Take 250 mg by mouth 2 times daily as needed for Pain      secukinumab (COSENTYX) 150 MG/ML SOSY INJECT ONE SYRINGE SUBCUTANEOUSLY  EVERY 4 WEEKS. REFRIGERATE. (Patient not taking: Reported on 5/17/2022) 1.96 mL 4    norethindrone-ethinyl estradiol (FYAVOLV) 1-5 MG-MCG TABS per tablet Take 1 tablet by mouth daily       No current facility-administered medications for this visit. PHYSICAL EXAMINATION / OBJECTIVE   Objective:  /65 (Site: Left Upper Arm, Position: Sitting, Cuff Size: Large Adult)   Pulse 86   Ht 5' 2.99\" (1.6 m)   Wt 160 lb 9.6 oz (72.8 kg)   SpO2 99%   BMI 28.46 kg/m²     Physical Exam  Vitals reviewed. Constitutional:       Appearance: She is well-developed. Cardiovascular:      Rate and Rhythm: Normal rate and regular rhythm. Pulmonary:      Effort: Pulmonary effort is normal.      Breath sounds: Normal breath sounds. Musculoskeletal:      Cervical back: Normal range of motion and neck supple. Skin:     General: Skin is warm and dry. Findings: No rash. Neurological:      Mental Status: She is alert and oriented to person, place, and time. Psychiatric:         Thought Content:  Thought content normal.       Upper extremities:   Shoulders:  Nontender, no swelling,   Elbows:      nontender  Wrists        Nontender, no swelling,   Hands:      MCPs tender right 3,4   PIPs tender right 2-5, left 4,5    Lower extremities:  Hips:       + tender bilat  Knees :   Nontender, no swelling  Ankles:   + tender bilat,  Feet:       + MTP compression bilat, tender bilat posterior heels    Spine:     + tender lumbar spine        RAPID 3:   5/17/2022 --- RAPID 3: 0.7 + 3 + 2 = 5.7     Remission: <3  Low Disease Activity: <6  Moderate Disease Activity: >=6 and <=12  High Disease Activity: >12     LABS    CBC  Lab Results   Component Value Date    WBC 5.2 12/07/2021    RBC 4.47 12/07/2021    RBC 4.34 04/23/2012    HGB 14.2 12/07/2021    HCT 42.1 12/07/2021    MCV 94.2 12/07/2021    MCH 31.8 12/07/2021    MCHC 33.7 12/07/2021    RDW 13.3 12/19/2017     12/07/2021       CMP  Lab Results   Component Value Date    CALCIUM 9.1 08/26/2021    LABALBU 4.5 08/26/2021    PROT 7.1 08/26/2021     08/26/2021    K 3.9 08/26/2021    CO2 24 08/26/2021     08/26/2021    BUN 12 08/26/2021    CREATININE 0.8 08/26/2021    ALKPHOS 53 08/26/2021    ALT 11 08/26/2021    AST 17 08/26/2021       HgBA1c: No components found for: HGBA1C    Lab Results   Component Value Date    VITD25 71 08/26/2021         Lab Results   Component Value Date    ANASCRN None Detected 01/03/2019     Lab Results   Component Value Date    SSA SEE BELOW 12/04/2020     Lab Results   Component Value Date    SSB 1 12/04/2020     No results found for: ANTI-SMITH  No results found for: DSDNAAB   No results found for: ANTIRNP  No results found for: C3, C4  Lab Results   Component Value Date    CCPAB 5 07/19/2019     Lab Results   Component Value Date    RF < 10 01/03/2019       No components found for: CANCASCRN, APANCASCRN  Lab Results   Component Value Date    SEDRATE 14 12/04/2020     Lab Results   Component Value Date    CRP 0.26 12/04/2020       RADIOLOGY:         ASSESSMENT/PLAN    Assessment   Plan     Undiff spondyloarthritis  Non radiographic axial spondyloarthropathy  - synovitis, enthesitis, chronic low back pain  - prior tx: methotrexate (faituge), enbrel (leukopenia), arava (rash)   - restart cosentyx 150 mg subcu q 4 weeks (June 2021)    Leukopenia- resolved on last evaluation    Chronic bilateral low back pain without sciatica  - + inflammatory features. No red flag symptoms  - xray lumbar spine w/o abnormalities. MRI pelvis w/o sacroiliitis. Dry eyes  - negative SSA, SSB- ? Related to #1    Medication monitoring   - CBC, CMP, sed rate, CRP q 4 months- due now      No follow-ups on file. Electronically signed by VIVIANA Reaves CNP on 5/17/2022 at 12:29 PM    New Prescriptions    No medications on file       Thank you for allowing me to participate in the care of this patient. Please call if there are any questions.

## 2022-05-19 ENCOUNTER — NURSE ONLY (OUTPATIENT)
Dept: LAB | Age: 52
End: 2022-05-19

## 2022-05-19 DIAGNOSIS — Z51.81 MEDICATION MONITORING ENCOUNTER: ICD-10-CM

## 2022-05-19 DIAGNOSIS — M45.A0 NON-RADIOGRAPHIC AXIAL SPONDYLOARTHRITIS (HCC): ICD-10-CM

## 2022-05-19 LAB
ALBUMIN SERPL-MCNC: 4.9 G/DL (ref 3.5–5.1)
ALP BLD-CCNC: 63 U/L (ref 38–126)
ALT SERPL-CCNC: 19 U/L (ref 11–66)
ANION GAP SERPL CALCULATED.3IONS-SCNC: 15 MEQ/L (ref 8–16)
AST SERPL-CCNC: 21 U/L (ref 5–40)
BASOPHILS # BLD: 0.8 %
BASOPHILS ABSOLUTE: 0 THOU/MM3 (ref 0–0.1)
BILIRUB SERPL-MCNC: 1 MG/DL (ref 0.3–1.2)
BUN BLDV-MCNC: 15 MG/DL (ref 7–22)
C-REACTIVE PROTEIN: < 0.3 MG/DL (ref 0–1)
CALCIUM SERPL-MCNC: 9.6 MG/DL (ref 8.5–10.5)
CHLORIDE BLD-SCNC: 103 MEQ/L (ref 98–111)
CO2: 23 MEQ/L (ref 23–33)
CREAT SERPL-MCNC: 0.8 MG/DL (ref 0.4–1.2)
EOSINOPHIL # BLD: 1.1 %
EOSINOPHILS ABSOLUTE: 0.1 THOU/MM3 (ref 0–0.4)
ERYTHROCYTE [DISTWIDTH] IN BLOOD BY AUTOMATED COUNT: 12.2 % (ref 11.5–14.5)
ERYTHROCYTE [DISTWIDTH] IN BLOOD BY AUTOMATED COUNT: 42.7 FL (ref 35–45)
GFR SERPL CREATININE-BSD FRML MDRD: 76 ML/MIN/1.73M2
GLUCOSE BLD-MCNC: 86 MG/DL (ref 70–108)
HCT VFR BLD CALC: 44.4 % (ref 37–47)
HEMOGLOBIN: 14.5 GM/DL (ref 12–16)
IMMATURE GRANS (ABS): 0.02 THOU/MM3 (ref 0–0.07)
IMMATURE GRANULOCYTES: 0.4 %
LYMPHOCYTES # BLD: 31.3 %
LYMPHOCYTES ABSOLUTE: 1.7 THOU/MM3 (ref 1–4.8)
MCH RBC QN AUTO: 31.3 PG (ref 26–33)
MCHC RBC AUTO-ENTMCNC: 32.7 GM/DL (ref 32.2–35.5)
MCV RBC AUTO: 95.7 FL (ref 81–99)
MONOCYTES # BLD: 10.6 %
MONOCYTES ABSOLUTE: 0.6 THOU/MM3 (ref 0.4–1.3)
NUCLEATED RED BLOOD CELLS: 0 /100 WBC
PLATELET # BLD: 254 THOU/MM3 (ref 130–400)
PMV BLD AUTO: 9.9 FL (ref 9.4–12.4)
POTASSIUM SERPL-SCNC: 4.2 MEQ/L (ref 3.5–5.2)
RBC # BLD: 4.64 MILL/MM3 (ref 4.2–5.4)
SEDIMENTATION RATE, ERYTHROCYTE: 16 MM/HR (ref 0–20)
SEG NEUTROPHILS: 55.8 %
SEGMENTED NEUTROPHILS ABSOLUTE COUNT: 3 THOU/MM3 (ref 1.8–7.7)
SODIUM BLD-SCNC: 141 MEQ/L (ref 135–145)
TOTAL PROTEIN: 7.7 G/DL (ref 6.1–8)
WBC # BLD: 5.3 THOU/MM3 (ref 4.8–10.8)

## 2022-08-02 DIAGNOSIS — M45.A0 NON-RADIOGRAPHIC AXIAL SPONDYLOARTHRITIS (HCC): ICD-10-CM

## 2022-08-02 RX ORDER — SECUKINUMAB 150 MG/ML
INJECTION SUBCUTANEOUS
Qty: 1 EACH | Refills: 4 | Status: SHIPPED | OUTPATIENT
Start: 2022-08-02 | End: 2022-10-19

## 2022-08-17 ENCOUNTER — OFFICE VISIT (OUTPATIENT)
Dept: RHEUMATOLOGY | Age: 52
End: 2022-08-17
Payer: COMMERCIAL

## 2022-08-17 ENCOUNTER — NURSE ONLY (OUTPATIENT)
Dept: LAB | Age: 52
End: 2022-08-17

## 2022-08-17 VITALS
BODY MASS INDEX: 28.35 KG/M2 | HEIGHT: 63 IN | HEART RATE: 95 BPM | DIASTOLIC BLOOD PRESSURE: 82 MMHG | WEIGHT: 160 LBS | SYSTOLIC BLOOD PRESSURE: 124 MMHG | OXYGEN SATURATION: 98 %

## 2022-08-17 DIAGNOSIS — M54.50 CHRONIC BILATERAL LOW BACK PAIN WITHOUT SCIATICA: ICD-10-CM

## 2022-08-17 DIAGNOSIS — Z51.81 MEDICATION MONITORING ENCOUNTER: ICD-10-CM

## 2022-08-17 DIAGNOSIS — M45.A0 NON-RADIOGRAPHIC AXIAL SPONDYLOARTHRITIS (HCC): Primary | ICD-10-CM

## 2022-08-17 DIAGNOSIS — G89.29 CHRONIC BILATERAL LOW BACK PAIN WITHOUT SCIATICA: ICD-10-CM

## 2022-08-17 LAB
ALBUMIN SERPL-MCNC: 4.4 G/DL (ref 3.5–5.1)
ALP BLD-CCNC: 59 U/L (ref 38–126)
ALT SERPL-CCNC: 19 U/L (ref 11–66)
ANION GAP SERPL CALCULATED.3IONS-SCNC: 15 MEQ/L (ref 8–16)
AST SERPL-CCNC: 24 U/L (ref 5–40)
BASOPHILS # BLD: 0.9 %
BASOPHILS ABSOLUTE: 0 THOU/MM3 (ref 0–0.1)
BILIRUB SERPL-MCNC: 1.2 MG/DL (ref 0.3–1.2)
BUN BLDV-MCNC: 7 MG/DL (ref 7–22)
C-REACTIVE PROTEIN: 0.71 MG/DL (ref 0–1)
CALCIUM SERPL-MCNC: 9.4 MG/DL (ref 8.5–10.5)
CHLORIDE BLD-SCNC: 102 MEQ/L (ref 98–111)
CO2: 24 MEQ/L (ref 23–33)
CREAT SERPL-MCNC: 0.7 MG/DL (ref 0.4–1.2)
EOSINOPHIL # BLD: 1.9 %
EOSINOPHILS ABSOLUTE: 0.1 THOU/MM3 (ref 0–0.4)
ERYTHROCYTE [DISTWIDTH] IN BLOOD BY AUTOMATED COUNT: 13.2 % (ref 11.5–14.5)
ERYTHROCYTE [DISTWIDTH] IN BLOOD BY AUTOMATED COUNT: 45.1 FL (ref 35–45)
GFR SERPL CREATININE-BSD FRML MDRD: 88 ML/MIN/1.73M2
GLUCOSE BLD-MCNC: 80 MG/DL (ref 70–108)
HCT VFR BLD CALC: 39.5 % (ref 37–47)
HEMOGLOBIN: 13.1 GM/DL (ref 12–16)
IMMATURE GRANS (ABS): 0.01 THOU/MM3 (ref 0–0.07)
IMMATURE GRANULOCYTES: 0.2 %
LYMPHOCYTES # BLD: 28.5 %
LYMPHOCYTES ABSOLUTE: 1.2 THOU/MM3 (ref 1–4.8)
MCH RBC QN AUTO: 31.1 PG (ref 26–33)
MCHC RBC AUTO-ENTMCNC: 33.2 GM/DL (ref 32.2–35.5)
MCV RBC AUTO: 93.8 FL (ref 81–99)
MONOCYTES # BLD: 10.8 %
MONOCYTES ABSOLUTE: 0.5 THOU/MM3 (ref 0.4–1.3)
NUCLEATED RED BLOOD CELLS: 0 /100 WBC
PLATELET # BLD: 221 THOU/MM3 (ref 130–400)
PMV BLD AUTO: 9.8 FL (ref 9.4–12.4)
POTASSIUM SERPL-SCNC: 4 MEQ/L (ref 3.5–5.2)
RBC # BLD: 4.21 MILL/MM3 (ref 4.2–5.4)
SEDIMENTATION RATE, ERYTHROCYTE: 8 MM/HR (ref 0–20)
SEG NEUTROPHILS: 57.7 %
SEGMENTED NEUTROPHILS ABSOLUTE COUNT: 2.5 THOU/MM3 (ref 1.8–7.7)
SODIUM BLD-SCNC: 141 MEQ/L (ref 135–145)
TOTAL PROTEIN: 6.7 G/DL (ref 6.1–8)
WBC # BLD: 4.3 THOU/MM3 (ref 4.8–10.8)

## 2022-08-17 PROCEDURE — 1036F TOBACCO NON-USER: CPT | Performed by: INTERNAL MEDICINE

## 2022-08-17 PROCEDURE — G8427 DOCREV CUR MEDS BY ELIG CLIN: HCPCS | Performed by: INTERNAL MEDICINE

## 2022-08-17 PROCEDURE — 99214 OFFICE O/P EST MOD 30 MIN: CPT | Performed by: INTERNAL MEDICINE

## 2022-08-17 PROCEDURE — 3017F COLORECTAL CA SCREEN DOC REV: CPT | Performed by: INTERNAL MEDICINE

## 2022-08-17 PROCEDURE — G8419 CALC BMI OUT NRM PARAM NOF/U: HCPCS | Performed by: INTERNAL MEDICINE

## 2022-08-17 RX ORDER — FOLIC ACID 1 MG/1
1 TABLET ORAL DAILY
Qty: 90 TABLET | Refills: 1 | Status: SHIPPED | OUTPATIENT
Start: 2022-08-17 | End: 2022-09-15 | Stop reason: SDUPTHER

## 2022-08-17 ASSESSMENT — ENCOUNTER SYMPTOMS
DIARRHEA: 0
EYE PAIN: 0
NAUSEA: 0
CONSTIPATION: 0
TROUBLE SWALLOWING: 0
COUGH: 0
ABDOMINAL PAIN: 0
SHORTNESS OF BREATH: 0
EYE ITCHING: 0

## 2022-08-17 NOTE — PROGRESS NOTES
[facility] RHEUMATOLOGY FOLLOW UP NOTE       Date Of Service: 8/17/2022  Provider: Amberly Titus DO    Name: Domi Skinner   MRN: [id number]    Chief Complaint(s)     Chief Complaint   Patient presents with    Follow-up     3 month f/u Non-radiographic axial spondyloarthritis        History of Present Illness (HPI)     Domi Skinner  is a(n)52 y.o. female with a hx of undifferentiated spondyloarthropathy here for the f/u evaluation of undifferentiated spondyloarthropathy     Cosentyx 150mg q 4 weeks - restarted 3-4 months ago with significant relief of pain , fatigue. Currently with arthralgia of the hips, right foot and lower back. Pain 3/10,  all day aching pain, w/ AM stiffness. greatest in the morning and evening  Timing: mornings    Aggravating factors: hands: certain foods, hips/knees: sitting for long periods, back: sitting for long periods   Alleviating factors: movement    AM stiffness lasting about 30 min. Swelling in the mcps, pips, and MTPs       REVIEW OF SYSTEMS: (ROS)    Review of Systems   Constitutional:  Positive for fatigue. Negative for fever and unexpected weight change. HENT:  Negative for congestion and trouble swallowing. Eyes:  Negative for pain and itching. Dry eyes   Respiratory:  Negative for cough and shortness of breath. Cardiovascular:  Negative for chest pain and leg swelling. Gastrointestinal:  Negative for abdominal pain, constipation, diarrhea and nausea. Endocrine: Negative for cold intolerance and heat intolerance. Genitourinary:  Negative for difficulty urinating, frequency and urgency. Skin:  Positive for rash. Neurological:  Positive for headaches. Negative for dizziness, weakness and numbness. Psychiatric/Behavioral:  Positive for sleep disturbance. The patient is not nervous/anxious.       PAST MEDICAL HISTORY      Past Medical History:   Diagnosis Date    Undifferentiated spondyloarthropathy 2015       PAST SURGICAL HISTORY      Past Surgical History:   Procedure Laterality Date    BREAST ENHANCEMENT SURGERY  5832    Silicone implants    ENDOMETRIAL ABLATION  2006    heavy periods       FAMILY HISTORY      Family History   Problem Relation Age of Onset    Mult Sclerosis Mother 27    Diabetes Father 72    Heart Disease Father 48        MI at 48    Lung Cancer Father [de-identified]    Diabetes Paternal Grandfather        SOCIAL HISTORY      Social History       Tobacco History       Smoking Status  Never Smoker      Smokeless Tobacco Use  Never Used              Alcohol History       Alcohol Use Status  Yes Drinks/Week  3 Glasses of wine, 2 Cans of beer per week Amount  5.0 standard drinks of alcohol/wk Comment  Occasionally              Drug Use       Drug Use Status  No              Sexual Activity       Sexually Active  Yes Partners  Male                    ALLERGIES     Allergies   Allergen Reactions    Sulfa Antibiotics Anaphylaxis       CURRENT MEDICATIONS      Current Outpatient Medications   Medication Sig Dispense Refill    secukinumab (COSENTYX) 150 MG/ML SOSY INJECT ONE SYRINGE SUBCUTANEOUSLY EVERY 4 WEEKS. REFRIGERATE. ALLOW 15 TO 30 MINUTES AT ROOM TEMP PRIOR TO ADMINISTRATION. 1 each 4    naproxen (NAPROSYN) 250 MG tablet Take 250 mg by mouth 2 times daily as needed for Pain      norethindrone-ethinyl estradiol (FYAVOLV) 1-5 MG-MCG TABS per tablet Take 1 tablet by mouth daily       No current facility-administered medications for this visit. PHYSICAL EXAMINATION / OBJECTIVE   Objective:  /82 (Site: Left Upper Arm, Position: Sitting, Cuff Size: Medium Adult)   Pulse 95   Ht 5' 2.99\" (1.6 m)   Wt 160 lb (72.6 kg)   SpO2 98%   BMI 28.35 kg/m²     Physical Exam  Vitals reviewed. Constitutional:       General: She is not in acute distress. Appearance: She is well-developed. She is not diaphoretic. HENT:      Head: Normocephalic and atraumatic.       Nose: Nose normal.      Mouth/Throat:      Mouth: Mucous membranes are moist.   Eyes:      General: No scleral icterus. Conjunctiva/sclera: Conjunctivae normal.   Cardiovascular:      Rate and Rhythm: Normal rate and regular rhythm. Heart sounds: Normal heart sounds. Pulmonary:      Effort: Pulmonary effort is normal. No respiratory distress. Breath sounds: Normal breath sounds. No wheezing or rales. Musculoskeletal:      Cervical back: Normal range of motion and neck supple. Lymphadenopathy:      Cervical: No cervical adenopathy. Skin:     General: Skin is warm and dry. Findings: No rash. Neurological:      Mental Status: She is alert and oriented to person, place, and time. Psychiatric:         Behavior: Behavior normal.         Thought Content: Thought content normal.     Upper extremities:   Shoulders:  Nontender, no swelling,   Elbows:      nontender  Wrists        Nontender, no swelling,   Hands:      MCPs tender 1-5 bilat, no synovitis. Lower extremities:  Hips:       + tender bilat  Knees :   Nontender, no swelling  Ankles:   + tender bilat,  Feet:       + MTP compression bilat,  mild swelling bilat MTPs, proximal fullness of bilat 2nd toes.      Spine:     + tender lumbar spine        RAPID 3:   8/17/2022 --- RAPID 3: 0.3 + 3 + 2 = 5.3    Remission: <3  Low Disease Activity: <6  Moderate Disease Activity: >=6 and <=12  High Disease Activity: >12     LABS    CBC  Lab Results   Component Value Date/Time    WBC 5.3 05/19/2022 08:53 AM    RBC 4.64 05/19/2022 08:53 AM    RBC 4.34 04/23/2012 04:35 PM    HGB 14.5 05/19/2022 08:53 AM    HCT 44.4 05/19/2022 08:53 AM    MCV 95.7 05/19/2022 08:53 AM    MCH 31.3 05/19/2022 08:53 AM    MCHC 32.7 05/19/2022 08:53 AM    RDW 13.3 12/19/2017 10:12 AM     05/19/2022 08:53 AM       CMP  Lab Results   Component Value Date/Time    CALCIUM 9.6 05/19/2022 08:53 AM    LABALBU 4.9 05/19/2022 08:53 AM    PROT 7.7 05/19/2022 08:53 AM     05/19/2022 08:53 AM    K 4.2 05/19/2022 08:53 AM    CO2 23 2022 08:53 AM     2022 08:53 AM    BUN 15 2022 08:53 AM    CREATININE 0.8 2022 08:53 AM    ALKPHOS 63 2022 08:53 AM    ALT 19 2022 08:53 AM    AST 21 2022 08:53 AM       HgBA1c: No components found for: HGBA1C    Lab Results   Component Value Date/Time    VITD25 71 2021 09:39 AM         Lab Results   Component Value Date    ANASCRN None Detected 2019     Lab Results   Component Value Date    SSA SEE BELOW 2020     Lab Results   Component Value Date    SSB 1 2020     Lab Results   Component Value Date    CCPAB 5 2019     Lab Results   Component Value Date    RF < 10 2019       No components found for: CANCASCRN, APANCASCRN  Lab Results   Component Value Date    SEDRATE 16 2022     Lab Results   Component Value Date    CRP < 0.30 2022       RADIOLOGY:         ASSESSMENT/PLAN    Assessment   Plan     Undiff spondyloarthritis  Non radiographic axial spondyloarthropathy  - synovitis, enthesitis, chronic low back pain  - prior tx: methotrexate (faituge), enbrel (leukopenia), arava (rash)   - Cosentyx 150 mg subcu q 4 weeks (2021)   - START - Methotrexate 7.5 mg po weekly   + folic acid 1. Po daily   Dosin.5- 25mg SC or PO weekly (>81=57 mg) + folic acid  Inhibits DNA synthesis, repair, and replication, decreasing inflammation by suppression adenosine levels Side effects include but ar not limited to : abd pain, nausea, diarrhea, fatigue, low blood counts, liver injury, increased risk of infection. RARE: nodulosis, increased risk of lymphoma and non melanoma skin cancer, pulmonary caring/inflammation. Reversal: high dose leucovorin     Chronic bilateral low back pain without sciatica - improved w/ consetyx. - + inflammatory features. No red flag symptoms  - xray lumbar spine w/o abnormalities. MRI pelvis w/o sacroiliitis. Dry eyes  - negative SSA, SSB- ?  Related to #1    Medication monitoring   - CBC, CMP, sed rate, CRP q 4 weeks x 3 w/ Methotrexate start       No follow-ups on file. Electronically signed by Josselyn Negrete DO on 8/17/2022 at 8:38 AM    New Prescriptions    No medications on file       Thank you for allowing me to participate in the care of this patient. Please call if there are any questions.

## 2022-08-21 LAB
QUANTI TB GOLD PLUS: NEGATIVE
QUANTI TB1 MINUS NIL: 0 IU/ML (ref 0–0.34)
QUANTI TB2 MINUS NIL: 0 IU/ML (ref 0–0.34)
QUANTIFERON MITOGEN MINUS NIL: > 10 IU/ML
QUANTIFERON NIL: 0.06 IU/ML

## 2022-09-10 DIAGNOSIS — M45.A0 NON-RADIOGRAPHIC AXIAL SPONDYLOARTHRITIS (HCC): ICD-10-CM

## 2022-09-12 RX ORDER — METHOTREXATE 2.5 MG/1
TABLET ORAL
Qty: 12 TABLET | Refills: 1 | OUTPATIENT
Start: 2022-09-12

## 2022-09-14 ENCOUNTER — NURSE ONLY (OUTPATIENT)
Dept: LAB | Age: 52
End: 2022-09-14

## 2022-09-14 DIAGNOSIS — Z51.81 MEDICATION MONITORING ENCOUNTER: ICD-10-CM

## 2022-09-14 LAB
ALBUMIN SERPL-MCNC: 4.6 G/DL (ref 3.5–5.1)
ALP BLD-CCNC: 68 U/L (ref 38–126)
ALT SERPL-CCNC: 17 U/L (ref 11–66)
ANION GAP SERPL CALCULATED.3IONS-SCNC: 16 MEQ/L (ref 8–16)
AST SERPL-CCNC: 28 U/L (ref 5–40)
BASOPHILS # BLD: 0.7 %
BASOPHILS ABSOLUTE: 0 THOU/MM3 (ref 0–0.1)
BILIRUB SERPL-MCNC: 1.7 MG/DL (ref 0.3–1.2)
BUN BLDV-MCNC: 9 MG/DL (ref 7–22)
C-REACTIVE PROTEIN: 0.6 MG/DL (ref 0–1)
CALCIUM SERPL-MCNC: 9.7 MG/DL (ref 8.5–10.5)
CHLORIDE BLD-SCNC: 97 MEQ/L (ref 98–111)
CO2: 25 MEQ/L (ref 23–33)
CREAT SERPL-MCNC: 0.7 MG/DL (ref 0.4–1.2)
EOSINOPHIL # BLD: 1.2 %
EOSINOPHILS ABSOLUTE: 0 THOU/MM3 (ref 0–0.4)
ERYTHROCYTE [DISTWIDTH] IN BLOOD BY AUTOMATED COUNT: 13.2 % (ref 11.5–14.5)
ERYTHROCYTE [DISTWIDTH] IN BLOOD BY AUTOMATED COUNT: 45.9 FL (ref 35–45)
GFR SERPL CREATININE-BSD FRML MDRD: 88 ML/MIN/1.73M2
GLUCOSE BLD-MCNC: 74 MG/DL (ref 70–108)
HCT VFR BLD CALC: 41.6 % (ref 37–47)
HEMOGLOBIN: 13.9 GM/DL (ref 12–16)
IMMATURE GRANS (ABS): 0.01 THOU/MM3 (ref 0–0.07)
IMMATURE GRANULOCYTES: 0.2 %
LYMPHOCYTES # BLD: 26.9 %
LYMPHOCYTES ABSOLUTE: 1.1 THOU/MM3 (ref 1–4.8)
MCH RBC QN AUTO: 31.8 PG (ref 26–33)
MCHC RBC AUTO-ENTMCNC: 33.4 GM/DL (ref 32.2–35.5)
MCV RBC AUTO: 95.2 FL (ref 81–99)
MONOCYTES # BLD: 11.2 %
MONOCYTES ABSOLUTE: 0.5 THOU/MM3 (ref 0.4–1.3)
NUCLEATED RED BLOOD CELLS: 0 /100 WBC
PLATELET # BLD: 241 THOU/MM3 (ref 130–400)
PMV BLD AUTO: 9.8 FL (ref 9.4–12.4)
POTASSIUM SERPL-SCNC: 3.6 MEQ/L (ref 3.5–5.2)
RBC # BLD: 4.37 MILL/MM3 (ref 4.2–5.4)
SEDIMENTATION RATE, ERYTHROCYTE: 10 MM/HR (ref 0–20)
SEG NEUTROPHILS: 59.8 %
SEGMENTED NEUTROPHILS ABSOLUTE COUNT: 2.5 THOU/MM3 (ref 1.8–7.7)
SODIUM BLD-SCNC: 138 MEQ/L (ref 135–145)
TOTAL PROTEIN: 7.5 G/DL (ref 6.1–8)
WBC # BLD: 4.1 THOU/MM3 (ref 4.8–10.8)

## 2022-09-15 DIAGNOSIS — M45.A0 NON-RADIOGRAPHIC AXIAL SPONDYLOARTHRITIS (HCC): ICD-10-CM

## 2022-09-15 RX ORDER — FOLIC ACID 1 MG/1
2 TABLET ORAL DAILY
Qty: 180 TABLET | Refills: 1
Start: 2022-09-15 | End: 2022-10-26 | Stop reason: SDUPTHER

## 2022-09-21 LAB
QUANTI TB GOLD PLUS: NEGATIVE
QUANTI TB1 MINUS NIL: 0.01 IU/ML (ref 0–0.34)
QUANTI TB2 MINUS NIL: 0 IU/ML (ref 0–0.34)
QUANTIFERON MITOGEN MINUS NIL: 9.15 IU/ML
QUANTIFERON NIL: 0.03 IU/ML

## 2022-10-10 DIAGNOSIS — Z51.81 MEDICATION MONITORING ENCOUNTER: ICD-10-CM

## 2022-10-10 LAB
ALBUMIN SERPL-MCNC: 4.5 G/DL (ref 3.5–5.1)
ALP BLD-CCNC: 68 U/L (ref 38–126)
ALT SERPL-CCNC: 13 U/L (ref 11–66)
ANION GAP SERPL CALCULATED.3IONS-SCNC: 14 MEQ/L (ref 8–16)
AST SERPL-CCNC: 19 U/L (ref 5–40)
BASOPHILS # BLD: 0.8 %
BASOPHILS ABSOLUTE: 0 THOU/MM3 (ref 0–0.1)
BILIRUB SERPL-MCNC: 1.1 MG/DL (ref 0.3–1.2)
BUN BLDV-MCNC: 10 MG/DL (ref 7–22)
C-REACTIVE PROTEIN: < 0.3 MG/DL (ref 0–1)
CALCIUM SERPL-MCNC: 9.9 MG/DL (ref 8.5–10.5)
CHLORIDE BLD-SCNC: 102 MEQ/L (ref 98–111)
CO2: 24 MEQ/L (ref 23–33)
CREAT SERPL-MCNC: 0.7 MG/DL (ref 0.4–1.2)
EOSINOPHIL # BLD: 1.2 %
EOSINOPHILS ABSOLUTE: 0.1 THOU/MM3 (ref 0–0.4)
ERYTHROCYTE [DISTWIDTH] IN BLOOD BY AUTOMATED COUNT: 13 % (ref 11.5–14.5)
ERYTHROCYTE [DISTWIDTH] IN BLOOD BY AUTOMATED COUNT: 46.2 FL (ref 35–45)
GFR SERPL CREATININE-BSD FRML MDRD: 88 ML/MIN/1.73M2
GLUCOSE BLD-MCNC: 90 MG/DL (ref 70–108)
HCT VFR BLD CALC: 43.1 % (ref 37–47)
HEMOGLOBIN: 14.2 GM/DL (ref 12–16)
IMMATURE GRANS (ABS): 0.02 THOU/MM3 (ref 0–0.07)
IMMATURE GRANULOCYTES: 0.4 %
LYMPHOCYTES # BLD: 28.5 %
LYMPHOCYTES ABSOLUTE: 1.4 THOU/MM3 (ref 1–4.8)
MCH RBC QN AUTO: 32 PG (ref 26–33)
MCHC RBC AUTO-ENTMCNC: 32.9 GM/DL (ref 32.2–35.5)
MCV RBC AUTO: 97.1 FL (ref 81–99)
MONOCYTES # BLD: 9 %
MONOCYTES ABSOLUTE: 0.4 THOU/MM3 (ref 0.4–1.3)
NUCLEATED RED BLOOD CELLS: 0 /100 WBC
PLATELET # BLD: 255 THOU/MM3 (ref 130–400)
PMV BLD AUTO: 10 FL (ref 9.4–12.4)
POTASSIUM SERPL-SCNC: 3.9 MEQ/L (ref 3.5–5.2)
RBC # BLD: 4.44 MILL/MM3 (ref 4.2–5.4)
SEDIMENTATION RATE, ERYTHROCYTE: 7 MM/HR (ref 0–20)
SEG NEUTROPHILS: 60.1 %
SEGMENTED NEUTROPHILS ABSOLUTE COUNT: 2.9 THOU/MM3 (ref 1.8–7.7)
SODIUM BLD-SCNC: 140 MEQ/L (ref 135–145)
TOTAL PROTEIN: 7.4 G/DL (ref 6.1–8)
WBC # BLD: 4.9 THOU/MM3 (ref 4.8–10.8)

## 2022-10-11 DIAGNOSIS — M45.A0 NON-RADIOGRAPHIC AXIAL SPONDYLOARTHRITIS (HCC): ICD-10-CM

## 2022-10-15 DIAGNOSIS — M45.A0 NON-RADIOGRAPHIC AXIAL SPONDYLOARTHRITIS (HCC): ICD-10-CM

## 2022-10-18 RX ORDER — METHOTREXATE 2.5 MG/1
TABLET ORAL
Qty: 12 TABLET | Refills: 1 | OUTPATIENT
Start: 2022-10-18

## 2022-10-26 DIAGNOSIS — M45.A0 NON-RADIOGRAPHIC AXIAL SPONDYLOARTHRITIS (HCC): ICD-10-CM

## 2022-10-26 RX ORDER — FOLIC ACID 1 MG/1
2 TABLET ORAL DAILY
Qty: 180 TABLET | Refills: 1 | Status: SHIPPED | OUTPATIENT
Start: 2022-10-26

## 2022-11-07 ENCOUNTER — NURSE ONLY (OUTPATIENT)
Dept: LAB | Age: 52
End: 2022-11-07

## 2022-11-07 DIAGNOSIS — Z51.81 MEDICATION MONITORING ENCOUNTER: ICD-10-CM

## 2022-11-07 DIAGNOSIS — M45.A0 NON-RADIOGRAPHIC AXIAL SPONDYLOARTHRITIS (HCC): ICD-10-CM

## 2022-11-07 LAB
ALBUMIN SERPL-MCNC: 4.7 G/DL (ref 3.5–5.1)
ALP BLD-CCNC: 66 U/L (ref 38–126)
ALT SERPL-CCNC: 16 U/L (ref 11–66)
ANION GAP SERPL CALCULATED.3IONS-SCNC: 13 MEQ/L (ref 8–16)
AST SERPL-CCNC: 19 U/L (ref 5–40)
BASOPHILS # BLD: 0.8 %
BASOPHILS ABSOLUTE: 0 THOU/MM3 (ref 0–0.1)
BILIRUB SERPL-MCNC: 0.9 MG/DL (ref 0.3–1.2)
BUN BLDV-MCNC: 11 MG/DL (ref 7–22)
C-REACTIVE PROTEIN: < 0.3 MG/DL (ref 0–1)
CALCIUM SERPL-MCNC: 9.2 MG/DL (ref 8.5–10.5)
CHLORIDE BLD-SCNC: 101 MEQ/L (ref 98–111)
CO2: 25 MEQ/L (ref 23–33)
CREAT SERPL-MCNC: 0.7 MG/DL (ref 0.4–1.2)
EOSINOPHIL # BLD: 2.1 %
EOSINOPHILS ABSOLUTE: 0.1 THOU/MM3 (ref 0–0.4)
ERYTHROCYTE [DISTWIDTH] IN BLOOD BY AUTOMATED COUNT: 12.9 % (ref 11.5–14.5)
ERYTHROCYTE [DISTWIDTH] IN BLOOD BY AUTOMATED COUNT: 45.3 FL (ref 35–45)
GFR SERPL CREATININE-BSD FRML MDRD: > 60 ML/MIN/1.73M2
GLUCOSE BLD-MCNC: 85 MG/DL (ref 70–108)
HCT VFR BLD CALC: 41.3 % (ref 37–47)
HEMOGLOBIN: 13.5 GM/DL (ref 12–16)
IMMATURE GRANS (ABS): 0.03 THOU/MM3 (ref 0–0.07)
IMMATURE GRANULOCYTES: 0.6 %
LYMPHOCYTES # BLD: 25.5 %
LYMPHOCYTES ABSOLUTE: 1.2 THOU/MM3 (ref 1–4.8)
MCH RBC QN AUTO: 31.5 PG (ref 26–33)
MCHC RBC AUTO-ENTMCNC: 32.7 GM/DL (ref 32.2–35.5)
MCV RBC AUTO: 96.5 FL (ref 81–99)
MONOCYTES # BLD: 8 %
MONOCYTES ABSOLUTE: 0.4 THOU/MM3 (ref 0.4–1.3)
NUCLEATED RED BLOOD CELLS: 0 /100 WBC
PLATELET # BLD: 259 THOU/MM3 (ref 130–400)
PMV BLD AUTO: 9.6 FL (ref 9.4–12.4)
POTASSIUM SERPL-SCNC: 4 MEQ/L (ref 3.5–5.2)
RBC # BLD: 4.28 MILL/MM3 (ref 4.2–5.4)
SEDIMENTATION RATE, ERYTHROCYTE: 17 MM/HR (ref 0–20)
SEG NEUTROPHILS: 63 %
SEGMENTED NEUTROPHILS ABSOLUTE COUNT: 3.1 THOU/MM3 (ref 1.8–7.7)
SODIUM BLD-SCNC: 139 MEQ/L (ref 135–145)
TOTAL PROTEIN: 6.9 G/DL (ref 6.1–8)
WBC # BLD: 4.9 THOU/MM3 (ref 4.8–10.8)

## 2022-11-07 RX ORDER — METHOTREXATE 2.5 MG/1
TABLET ORAL
Qty: 12 TABLET | Refills: 1 | Status: SHIPPED | OUTPATIENT
Start: 2022-11-07

## 2022-11-07 NOTE — TELEPHONE ENCOUNTER
DOLV: 08/17/22  DONV: 11/21/22  LAST LAB DRAW: 11/07/22  LAST TB TEST: 11/21/22    Lab Results   Component Value Date     11/07/2022    K 4.0 11/07/2022     11/07/2022    CO2 25 11/07/2022    BUN 11 11/07/2022    CREATININE 0.7 11/07/2022    GLUCOSE 85 11/07/2022    CALCIUM 9.2 11/07/2022    PROT 6.9 11/07/2022    LABALBU 4.7 11/07/2022    BILITOT 0.9 11/07/2022    ALKPHOS 66 11/07/2022    AST 19 11/07/2022    ALT 16 11/07/2022    LABGLOM >60 11/07/2022       Recent Labs     11/07/22  1154 10/10/22  1238   WBC 4.9 4.9   HGB 13.5 14.2   HCT 41.3 43.1   MCV 96.5 97.1    255       Lab Results   Component Value Date    SEDRATE 7 10/10/2022       Lab Results   Component Value Date    CRP < 0.30 11/07/2022

## 2022-11-21 ENCOUNTER — OFFICE VISIT (OUTPATIENT)
Dept: RHEUMATOLOGY | Age: 52
End: 2022-11-21
Payer: COMMERCIAL

## 2022-11-21 VITALS
HEART RATE: 81 BPM | DIASTOLIC BLOOD PRESSURE: 78 MMHG | SYSTOLIC BLOOD PRESSURE: 108 MMHG | BODY MASS INDEX: 25.96 KG/M2 | HEIGHT: 63 IN | WEIGHT: 146.5 LBS | OXYGEN SATURATION: 97 %

## 2022-11-21 DIAGNOSIS — Z51.81 MEDICATION MONITORING ENCOUNTER: ICD-10-CM

## 2022-11-21 DIAGNOSIS — M54.50 CHRONIC BILATERAL LOW BACK PAIN WITHOUT SCIATICA: ICD-10-CM

## 2022-11-21 DIAGNOSIS — G89.29 CHRONIC BILATERAL LOW BACK PAIN WITHOUT SCIATICA: ICD-10-CM

## 2022-11-21 DIAGNOSIS — M45.A0 NON-RADIOGRAPHIC AXIAL SPONDYLOARTHRITIS (HCC): Primary | ICD-10-CM

## 2022-11-21 PROCEDURE — G8427 DOCREV CUR MEDS BY ELIG CLIN: HCPCS | Performed by: NURSE PRACTITIONER

## 2022-11-21 PROCEDURE — 1036F TOBACCO NON-USER: CPT | Performed by: NURSE PRACTITIONER

## 2022-11-21 PROCEDURE — 99214 OFFICE O/P EST MOD 30 MIN: CPT | Performed by: NURSE PRACTITIONER

## 2022-11-21 PROCEDURE — G8484 FLU IMMUNIZE NO ADMIN: HCPCS | Performed by: NURSE PRACTITIONER

## 2022-11-21 PROCEDURE — 3017F COLORECTAL CA SCREEN DOC REV: CPT | Performed by: NURSE PRACTITIONER

## 2022-11-21 PROCEDURE — G8419 CALC BMI OUT NRM PARAM NOF/U: HCPCS | Performed by: NURSE PRACTITIONER

## 2022-11-21 ASSESSMENT — ENCOUNTER SYMPTOMS
BACK PAIN: 1
SHORTNESS OF BREATH: 0
TROUBLE SWALLOWING: 0
EYE PAIN: 0
COUGH: 0
DIARRHEA: 0
ABDOMINAL PAIN: 0
EYE ITCHING: 0
NAUSEA: 0
CONSTIPATION: 0

## 2023-01-16 RX ORDER — SECUKINUMAB 150 MG/ML
150 INJECTION SUBCUTANEOUS
Qty: 1 ADJUSTABLE DOSE PRE-FILLED PEN SYRINGE | Refills: 5 | Status: ACTIVE | OUTPATIENT
Start: 2023-01-16

## 2023-01-23 ENCOUNTER — TELEPHONE (OUTPATIENT)
Dept: RHEUMATOLOGY | Age: 53
End: 2023-01-23

## 2023-01-23 DIAGNOSIS — M45.A0 NON-RADIOGRAPHIC AXIAL SPONDYLOARTHRITIS (HCC): Primary | ICD-10-CM

## 2023-01-23 NOTE — TELEPHONE ENCOUNTER
Cosentyx renewal PA denied due to worsened joint pain in most recent progress notes. How would you like to proceeded?

## 2023-01-24 RX ORDER — IXEKIZUMAB 80 MG/ML
INJECTION, SOLUTION SUBCUTANEOUS
Qty: 2 EACH | Refills: 0 | Status: ACTIVE | OUTPATIENT
Start: 2023-01-24 | End: 2023-02-21 | Stop reason: CLARIF

## 2023-01-24 RX ORDER — IXEKIZUMAB 80 MG/ML
80 INJECTION, SOLUTION SUBCUTANEOUS
Qty: 1 EACH | Refills: 5 | Status: ACTIVE | OUTPATIENT
Start: 2023-01-24

## 2023-01-24 NOTE — TELEPHONE ENCOUNTER
Patient stated she feels much better physically since stopping the methotrexate. Starting to have joint pain now in elbows and both hands. Overall pain has not changed and morning stiffness has gotten worse. 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 She stated she would be willing to try the drug you talked about in her last appointment visit.

## 2023-02-21 ENCOUNTER — OFFICE VISIT (OUTPATIENT)
Dept: RHEUMATOLOGY | Age: 53
End: 2023-02-21
Payer: COMMERCIAL

## 2023-02-21 VITALS
BODY MASS INDEX: 27.46 KG/M2 | HEART RATE: 71 BPM | HEIGHT: 63 IN | DIASTOLIC BLOOD PRESSURE: 76 MMHG | WEIGHT: 155 LBS | OXYGEN SATURATION: 100 % | SYSTOLIC BLOOD PRESSURE: 118 MMHG

## 2023-02-21 DIAGNOSIS — M45.A0 NON-RADIOGRAPHIC AXIAL SPONDYLOARTHRITIS (HCC): Primary | ICD-10-CM

## 2023-02-21 DIAGNOSIS — Z51.81 MEDICATION MONITORING ENCOUNTER: ICD-10-CM

## 2023-02-21 DIAGNOSIS — G89.29 CHRONIC BILATERAL LOW BACK PAIN WITHOUT SCIATICA: ICD-10-CM

## 2023-02-21 DIAGNOSIS — M54.50 CHRONIC BILATERAL LOW BACK PAIN WITHOUT SCIATICA: ICD-10-CM

## 2023-02-21 PROCEDURE — G8484 FLU IMMUNIZE NO ADMIN: HCPCS | Performed by: NURSE PRACTITIONER

## 2023-02-21 PROCEDURE — G8419 CALC BMI OUT NRM PARAM NOF/U: HCPCS | Performed by: NURSE PRACTITIONER

## 2023-02-21 PROCEDURE — 1036F TOBACCO NON-USER: CPT | Performed by: NURSE PRACTITIONER

## 2023-02-21 PROCEDURE — 3017F COLORECTAL CA SCREEN DOC REV: CPT | Performed by: NURSE PRACTITIONER

## 2023-02-21 PROCEDURE — 99214 OFFICE O/P EST MOD 30 MIN: CPT | Performed by: NURSE PRACTITIONER

## 2023-02-21 PROCEDURE — G8427 DOCREV CUR MEDS BY ELIG CLIN: HCPCS | Performed by: NURSE PRACTITIONER

## 2023-02-21 RX ORDER — PREDNISONE 1 MG/1
TABLET ORAL
Qty: 40 TABLET | Refills: 0 | Status: SHIPPED | OUTPATIENT
Start: 2023-02-21 | End: 2023-03-09

## 2023-02-21 ASSESSMENT — ENCOUNTER SYMPTOMS
COUGH: 0
EYE PAIN: 0
EYE ITCHING: 0
BACK PAIN: 1
TROUBLE SWALLOWING: 0
SHORTNESS OF BREATH: 0
CONSTIPATION: 0
ABDOMINAL PAIN: 0
DIARRHEA: 0
NAUSEA: 0

## 2023-02-21 NOTE — PROGRESS NOTES
[facility] RHEUMATOLOGY FOLLOW UP NOTE       Date Of Service: 2/21/2023  Provider: VIVIANA Hawley - TEVIN    Name: Chasity Matthew   MRN: [id number]    Chief Complaint(s)     Chief Complaint   Patient presents with    Follow-up     3 month f/u Non-radiographic axial spondyloarthritis     Bilateral elbows, lower back, bilateral hips, bilateral feet, hands/fingers worse than normal swelling heat         History of Present Illness (HPI)     Chasity Matthew  is a(n)53 y.o. female with a hx of undifferentiated spondyloarthropathy here for the f/u evaluation of undifferentiated spondyloarthropathy     Interval hx:    - just started taltz 2/7- tolerated well    pain affecting the fingers, elbows, hips, feet, low back  Pain on a scale 0-10: 4.5/10  Type of pain: constant   Timing: mornings    Aggravating factors: hands: certain foods, hips/knees: sitting for long periods, back: sitting for long periods   Alleviating factors: movement      Associated symptoms:  + swelling/  Redness/ warmth (fingers, ankles/feet), + AM stiffness lasting ~ several hours      REVIEW OF SYSTEMS: (ROS)    Review of Systems   Constitutional:  Positive for fatigue. Negative for fever and unexpected weight change. HENT:  Negative for congestion and trouble swallowing. Eyes:  Negative for pain and itching. Dry eyes   Respiratory:  Negative for cough and shortness of breath. Cardiovascular:  Negative for chest pain and leg swelling. Gastrointestinal:  Negative for abdominal pain, constipation, diarrhea and nausea. Endocrine: Negative for cold intolerance and heat intolerance. Genitourinary:  Negative for difficulty urinating, frequency and urgency. Musculoskeletal:  Positive for arthralgias, back pain, joint swelling and myalgias. Skin:  Negative for rash. Neurological:  Positive for numbness (hands) and headaches. Negative for dizziness and weakness.    Psychiatric/Behavioral:  Positive for sleep disturbance. The patient is not nervous/anxious. PAST MEDICAL HISTORY      Past Medical History:   Diagnosis Date    Undifferentiated spondyloarthropathy 2015       PAST SURGICAL HISTORY      Past Surgical History:   Procedure Laterality Date    BREAST ENHANCEMENT SURGERY  0651    Silicone implants    ENDOMETRIAL ABLATION  2006    heavy periods       FAMILY HISTORY      Family History   Problem Relation Age of Onset    Mult Sclerosis Mother 27    Diabetes Father 72    Heart Disease Father 48        MI at 48    Lung Cancer Father [de-identified]    Diabetes Paternal Grandfather        SOCIAL HISTORY      Social History       Tobacco History       Smoking Status  Never Smoker      Smokeless Tobacco Use  Never Used              Alcohol History       Alcohol Use Status  Yes Drinks/Week  3 Glasses of wine, 2 Cans of beer per week Amount  5.0 standard drinks of alcohol/wk Comment  Occasionally              Drug Use       Drug Use Status  No              Sexual Activity       Sexually Active  Yes Partners  Male                    ALLERGIES     Allergies   Allergen Reactions    Sulfa Antibiotics Anaphylaxis       CURRENT MEDICATIONS      Current Outpatient Medications   Medication Sig Dispense Refill    predniSONE (DELTASONE) 5 MG tablet Take 4 tablets by mouth daily for 4 days, THEN 3 tablets daily for 4 days, THEN 2 tablets daily for 4 days, THEN 1 tablet daily for 4 days. 40 tablet 0    ixekizumab (TALTZ) 80 MG/ML SOAJ prefilled syringe Inject 1 mL into the skin every 28 days 1 each 5    naproxen (NAPROSYN) 250 MG tablet Take 250 mg by mouth 2 times daily as needed for Pain       No current facility-administered medications for this visit. PHYSICAL EXAMINATION / OBJECTIVE   Objective:  /76 (Site: Left Upper Arm, Position: Sitting, Cuff Size: Medium Adult)   Pulse 71   Ht 5' 2.99\" (1.6 m)   Wt 155 lb (70.3 kg)   SpO2 100%   BMI 27.46 kg/m²     Physical Exam  Vitals reviewed.    Constitutional:       Appearance: She is well-developed. Cardiovascular:      Rate and Rhythm: Normal rate and regular rhythm. Pulmonary:      Effort: Pulmonary effort is normal.      Breath sounds: Normal breath sounds. Musculoskeletal:      Cervical back: Normal range of motion and neck supple. Skin:     General: Skin is warm and dry. Findings: No rash. Neurological:      Mental Status: She is alert and oriented to person, place, and time. Psychiatric:         Thought Content:  Thought content normal.       Upper extremities:   Shoulders:  Nontender, no swelling,   Elbows:      tender bilat  Wrists        tender right  Hands:      MCPs tender right 2-4   PIPs tender bilat, + fullness right 2,3 PIP    Lower extremities:  Hips:       + tender bilat  Knees :   Nontender, no swelling  Ankles:   + tender bilat,  Feet:       + MTP compression and bogginess bilat, tender bilat posterior heels, + warmth left posterior heel    Spine:     + tender lumbar spine    RAPID3 Composite Score = MDHAQ (0-10) + Patient pain VAS (0-10): + Patient global assessment VAS (0-10):     2/21/2023 --- RAPID 3: 1.3 + 4.5 + 3.5 = 9.3     Remission: <3  Low Disease Activity: <6  Moderate Disease Activity: >=6 and <=12  High Disease Activity: >12       LABS    CBC  Lab Results   Component Value Date/Time    WBC 4.9 11/07/2022 11:54 AM    RBC 4.28 11/07/2022 11:54 AM    RBC 4.34 04/23/2012 04:35 PM    HGB 13.5 11/07/2022 11:54 AM    HCT 41.3 11/07/2022 11:54 AM    MCV 96.5 11/07/2022 11:54 AM    MCH 31.5 11/07/2022 11:54 AM    MCHC 32.7 11/07/2022 11:54 AM    RDW 13.3 12/19/2017 10:12 AM     11/07/2022 11:54 AM       CMP  Lab Results   Component Value Date/Time    CALCIUM 9.2 11/07/2022 11:53 AM    LABALBU 4.7 11/07/2022 11:53 AM    PROT 6.9 11/07/2022 11:53 AM     11/07/2022 11:53 AM    K 4.0 11/07/2022 11:53 AM    CO2 25 11/07/2022 11:53 AM     11/07/2022 11:53 AM    BUN 11 11/07/2022 11:53 AM    CREATININE 0.7 11/07/2022 11:53 AM    ALKPHOS 66 11/07/2022 11:53 AM    ALT 16 11/07/2022 11:53 AM    AST 19 11/07/2022 11:53 AM       HgBA1c: No components found for: HGBA1C    Lab Results   Component Value Date/Time    TITE02 72 08/26/2021 09:39 AM         Lab Results   Component Value Date    ANASCRN None Detected 01/03/2019     Lab Results   Component Value Date    SSA SEE BELOW 12/04/2020     Lab Results   Component Value Date    SSB 1 12/04/2020     No results found for: ANTI-SMITH  No results found for: DSDNAAB   No results found for: ANTIRNP  No results found for: C3, C4  Lab Results   Component Value Date    CCPAB 5 07/19/2019     Lab Results   Component Value Date    RF < 10 01/03/2019       No components found for: CANCASCRN, APANCASCRN  Lab Results   Component Value Date    SEDRATE 17 11/07/2022     Lab Results   Component Value Date    CRP < 0.30 11/07/2022       RADIOLOGY:         ASSESSMENT/PLAN    Assessment   Plan     Undiff spondyloarthritis  Non radiographic axial spondyloarthropathy  - synovitis, enthesitis, chronic low back pain  - prior tx: methotrexate (fatigue), enbrel (leukopenia), arava (rash), methotrexate 7.5 (increased joint pains, fatigue), cosentyx 150 mg (cont disease)   - taltz 80 mg subcu w 4 weeks (2/2023)   - prednisone taper for current flare    Chronic bilateral low back pain without sciatica  - + inflammatory features. No red flag symptoms  - xray lumbar spine w/o abnormalities. MRI pelvis w/o sacroiliitis. Dry eyes  - negative SSA, SSB- ? Related to #1    Medication monitoring   - CBC, CMP, sed rate, CRP q 4 months      No follow-ups on file. Electronically signed by VIVIANA Lopez CNP on 2/21/2023 at 11:35 AM    New Prescriptions    PREDNISONE (DELTASONE) 5 MG TABLET    Take 4 tablets by mouth daily for 4 days, THEN 3 tablets daily for 4 days, THEN 2 tablets daily for 4 days, THEN 1 tablet daily for 4 days. Thank you for allowing me to participate in the care of this patient.    Please call if there are any questions.

## 2023-03-24 ENCOUNTER — HOSPITAL ENCOUNTER (OUTPATIENT)
Dept: WOMENS IMAGING | Age: 53
Discharge: HOME | End: 2023-03-24
Payer: COMMERCIAL

## 2023-03-24 DIAGNOSIS — Z12.31 VISIT FOR SCREENING MAMMOGRAM: ICD-10-CM

## 2023-03-24 PROCEDURE — 77063 BREAST TOMOSYNTHESIS BI: CPT

## 2023-05-22 ENCOUNTER — OFFICE VISIT (OUTPATIENT)
Dept: RHEUMATOLOGY | Age: 53
End: 2023-05-22
Payer: COMMERCIAL

## 2023-05-22 ENCOUNTER — NURSE ONLY (OUTPATIENT)
Dept: LAB | Age: 53
End: 2023-05-22

## 2023-05-22 VITALS
WEIGHT: 149.6 LBS | HEART RATE: 96 BPM | DIASTOLIC BLOOD PRESSURE: 60 MMHG | OXYGEN SATURATION: 96 % | HEIGHT: 63 IN | SYSTOLIC BLOOD PRESSURE: 112 MMHG | BODY MASS INDEX: 26.51 KG/M2

## 2023-05-22 DIAGNOSIS — Z79.1 NSAID LONG-TERM USE: ICD-10-CM

## 2023-05-22 DIAGNOSIS — Z51.81 MEDICATION MONITORING ENCOUNTER: ICD-10-CM

## 2023-05-22 DIAGNOSIS — Z23 ENCOUNTER FOR VACCINATION: ICD-10-CM

## 2023-05-22 DIAGNOSIS — M45.A0 NON-RADIOGRAPHIC AXIAL SPONDYLOARTHRITIS (HCC): Primary | ICD-10-CM

## 2023-05-22 DIAGNOSIS — G89.29 CHRONIC BILATERAL LOW BACK PAIN WITHOUT SCIATICA: ICD-10-CM

## 2023-05-22 DIAGNOSIS — D72.819 LEUKOPENIA, UNSPECIFIED TYPE: ICD-10-CM

## 2023-05-22 DIAGNOSIS — M54.50 CHRONIC BILATERAL LOW BACK PAIN WITHOUT SCIATICA: ICD-10-CM

## 2023-05-22 LAB
ALBUMIN SERPL BCG-MCNC: 4.8 G/DL (ref 3.5–5.1)
ALP SERPL-CCNC: 64 U/L (ref 38–126)
ALT SERPL W/O P-5'-P-CCNC: 14 U/L (ref 11–66)
ANION GAP SERPL CALC-SCNC: 10 MEQ/L (ref 8–16)
AST SERPL-CCNC: 21 U/L (ref 5–40)
BASOPHILS ABSOLUTE: 0 THOU/MM3 (ref 0–0.1)
BASOPHILS NFR BLD AUTO: 1.2 %
BILIRUB SERPL-MCNC: 1.8 MG/DL (ref 0.3–1.2)
BUN SERPL-MCNC: 15 MG/DL (ref 7–22)
CALCIUM SERPL-MCNC: 9.7 MG/DL (ref 8.5–10.5)
CHLORIDE SERPL-SCNC: 101 MEQ/L (ref 98–111)
CO2 SERPL-SCNC: 29 MEQ/L (ref 23–33)
CREAT SERPL-MCNC: 0.8 MG/DL (ref 0.4–1.2)
CRP SERPL-MCNC: 0.48 MG/DL (ref 0–1)
DEPRECATED RDW RBC AUTO: 45.2 FL (ref 35–45)
EOSINOPHIL NFR BLD AUTO: 1.7 %
EOSINOPHILS ABSOLUTE: 0.1 THOU/MM3 (ref 0–0.4)
ERYTHROCYTE [DISTWIDTH] IN BLOOD BY AUTOMATED COUNT: 12.6 % (ref 11.5–14.5)
ERYTHROCYTE [SEDIMENTATION RATE] IN BLOOD BY WESTERGREN METHOD: 29 MM/HR (ref 0–20)
GFR SERPL CREATININE-BSD FRML MDRD: > 60 ML/MIN/1.73M2
GLUCOSE SERPL-MCNC: 111 MG/DL (ref 70–108)
HCT VFR BLD AUTO: 45.5 % (ref 37–47)
HGB BLD-MCNC: 14.7 GM/DL (ref 12–16)
IMM GRANULOCYTES # BLD AUTO: 0.01 THOU/MM3 (ref 0–0.07)
IMM GRANULOCYTES NFR BLD AUTO: 0.3 %
LYMPHOCYTES ABSOLUTE: 1.2 THOU/MM3 (ref 1–4.8)
LYMPHOCYTES NFR BLD AUTO: 34.8 %
MCH RBC QN AUTO: 31.5 PG (ref 26–33)
MCHC RBC AUTO-ENTMCNC: 32.3 GM/DL (ref 32.2–35.5)
MCV RBC AUTO: 97.6 FL (ref 81–99)
MONOCYTES ABSOLUTE: 0.4 THOU/MM3 (ref 0.4–1.3)
MONOCYTES NFR BLD AUTO: 10.7 %
NEUTROPHILS NFR BLD AUTO: 51.3 %
NRBC BLD AUTO-RTO: 0 /100 WBC
PLATELET # BLD AUTO: 244 THOU/MM3 (ref 130–400)
PMV BLD AUTO: 9.6 FL (ref 9.4–12.4)
POTASSIUM SERPL-SCNC: 4.4 MEQ/L (ref 3.5–5.2)
PROT SERPL-MCNC: 7.8 G/DL (ref 6.1–8)
RBC # BLD AUTO: 4.66 MILL/MM3 (ref 4.2–5.4)
SEGMENTED NEUTROPHILS ABSOLUTE COUNT: 1.8 THOU/MM3 (ref 1.8–7.7)
SODIUM SERPL-SCNC: 140 MEQ/L (ref 135–145)
WBC # BLD AUTO: 3.5 THOU/MM3 (ref 4.8–10.8)

## 2023-05-22 PROCEDURE — 99214 OFFICE O/P EST MOD 30 MIN: CPT | Performed by: INTERNAL MEDICINE

## 2023-05-22 PROCEDURE — 3017F COLORECTAL CA SCREEN DOC REV: CPT | Performed by: INTERNAL MEDICINE

## 2023-05-22 PROCEDURE — G8427 DOCREV CUR MEDS BY ELIG CLIN: HCPCS | Performed by: INTERNAL MEDICINE

## 2023-05-22 PROCEDURE — 1036F TOBACCO NON-USER: CPT | Performed by: INTERNAL MEDICINE

## 2023-05-22 PROCEDURE — G8419 CALC BMI OUT NRM PARAM NOF/U: HCPCS | Performed by: INTERNAL MEDICINE

## 2023-05-22 RX ORDER — FAMOTIDINE 20 MG/1
20 TABLET, FILM COATED ORAL 2 TIMES DAILY
Qty: 60 TABLET | Refills: 3 | Status: SHIPPED | OUTPATIENT
Start: 2023-05-22

## 2023-05-22 RX ORDER — NAPROXEN 375 MG/1
375 TABLET ORAL 2 TIMES DAILY PRN
Qty: 60 TABLET | Refills: 3 | Status: SHIPPED | OUTPATIENT
Start: 2023-05-22

## 2023-05-22 ASSESSMENT — ENCOUNTER SYMPTOMS
COUGH: 0
BACK PAIN: 1
EYE PAIN: 0
ABDOMINAL PAIN: 0
EYE ITCHING: 0
SHORTNESS OF BREATH: 0
TROUBLE SWALLOWING: 0
CONSTIPATION: 0
NAUSEA: 0
DIARRHEA: 0

## 2023-05-22 NOTE — PROGRESS NOTES
[facility] UP NOTE       Date Of Service: 5/22/2023  Provider: Waleska Allan DO    Name: Ac Dubon   MRN: [id number]    Chief Complaint(s)     Chief Complaint   Patient presents with    Follow-up     6 month follow up        History of Present Illness (HPI)     Ac Dubon  is a(n)53 y.o. female with a hx of undifferentiated spondyloarthropathy here for the f/u evaluation of undifferentiated spondyloarthropathy     Interval hx:    - started taltz 2/7- tolerated well w/ improvement of pain. - increased night sweats x 2 months.    - injection site reaction x 1 last month and lasting 3-4 days prior to resolving. Pain in the hands, mid and lower back, bilateral hips ankles/feet. No longer having pain in the heal.   Soreness of hips and back. Pain around 3./10 over the past week. Aggravating factors: hands: hips/knees: sitting for long periods, back: sitting for long periods   Alleviating factors: movement    AM stiffness lasting about 30 minutes. Denies joint swelling     Random blurred vision - due for eye exam - last was around 2022. REVIEW OF SYSTEMS: (ROS)    Review of Systems   Constitutional:  Positive for fatigue. Negative for fever and unexpected weight change. HENT:  Negative for congestion and trouble swallowing. Eyes:  Negative for pain and itching. Dry eyes   Respiratory:  Negative for cough and shortness of breath. Cardiovascular:  Negative for chest pain and leg swelling. Gastrointestinal:  Negative for abdominal pain, constipation, diarrhea and nausea. Endocrine: Negative for cold intolerance and heat intolerance. Genitourinary:  Negative for difficulty urinating, frequency and urgency. Musculoskeletal:  Positive for arthralgias, back pain, joint swelling and myalgias. Skin:  Negative for rash. Neurological:  Positive for numbness (hands). Negative for dizziness, weakness and headaches.    Psychiatric/Behavioral:

## 2023-07-11 ENCOUNTER — PATIENT MESSAGE (OUTPATIENT)
Dept: RHEUMATOLOGY | Age: 53
End: 2023-07-11

## 2023-07-11 DIAGNOSIS — M45.A0 NON-RADIOGRAPHIC AXIAL SPONDYLOARTHRITIS (HCC): ICD-10-CM

## 2023-07-11 RX ORDER — IXEKIZUMAB 80 MG/ML
80 INJECTION, SOLUTION SUBCUTANEOUS
Qty: 1 EACH | Refills: 5 | Status: ACTIVE | OUTPATIENT
Start: 2023-07-11

## 2023-07-19 ENCOUNTER — TELEPHONE (OUTPATIENT)
Dept: RHEUMATOLOGY | Age: 53
End: 2023-07-19

## 2023-08-08 ENCOUNTER — OFFICE VISIT (OUTPATIENT)
Dept: RHEUMATOLOGY | Age: 53
End: 2023-08-08
Payer: COMMERCIAL

## 2023-08-08 VITALS
DIASTOLIC BLOOD PRESSURE: 70 MMHG | HEIGHT: 63 IN | WEIGHT: 152.7 LBS | HEART RATE: 109 BPM | BODY MASS INDEX: 27.05 KG/M2 | SYSTOLIC BLOOD PRESSURE: 112 MMHG | OXYGEN SATURATION: 99 %

## 2023-08-08 DIAGNOSIS — Z51.81 MEDICATION MONITORING ENCOUNTER: ICD-10-CM

## 2023-08-08 DIAGNOSIS — M45.A0 NON-RADIOGRAPHIC AXIAL SPONDYLOARTHRITIS (HCC): Primary | ICD-10-CM

## 2023-08-08 DIAGNOSIS — M54.50 CHRONIC BILATERAL LOW BACK PAIN WITHOUT SCIATICA: ICD-10-CM

## 2023-08-08 DIAGNOSIS — Z79.1 NSAID LONG-TERM USE: ICD-10-CM

## 2023-08-08 DIAGNOSIS — G89.29 CHRONIC BILATERAL LOW BACK PAIN WITHOUT SCIATICA: ICD-10-CM

## 2023-08-08 PROCEDURE — G8427 DOCREV CUR MEDS BY ELIG CLIN: HCPCS | Performed by: NURSE PRACTITIONER

## 2023-08-08 PROCEDURE — 3017F COLORECTAL CA SCREEN DOC REV: CPT | Performed by: NURSE PRACTITIONER

## 2023-08-08 PROCEDURE — G8419 CALC BMI OUT NRM PARAM NOF/U: HCPCS | Performed by: NURSE PRACTITIONER

## 2023-08-08 PROCEDURE — 1036F TOBACCO NON-USER: CPT | Performed by: NURSE PRACTITIONER

## 2023-08-08 PROCEDURE — 99214 OFFICE O/P EST MOD 30 MIN: CPT | Performed by: NURSE PRACTITIONER

## 2023-08-08 RX ORDER — SERDEXMETHYLPHENIDATE AND DEXMETHYLPHENIDATE 10.4; 52.3 MG/1; MG/1
CAPSULE ORAL
COMMUNITY
Start: 2023-06-20

## 2023-08-08 ASSESSMENT — ENCOUNTER SYMPTOMS
EYE ITCHING: 0
BACK PAIN: 1
EYE PAIN: 0
DIARRHEA: 0
NAUSEA: 0
CONSTIPATION: 0
TROUBLE SWALLOWING: 0
SHORTNESS OF BREATH: 0
ABDOMINAL PAIN: 0
COUGH: 0

## 2023-08-08 NOTE — PROGRESS NOTES
[facility] RHEUMATOLOGY FOLLOW UP NOTE       Date Of Service: 8/8/2023  Provider: VIVIANA Ferrell - TEVIN    Name: Citlaly Cifuentes   MRN: [id number]    Chief Complaint(s)     Chief Complaint   Patient presents with    Follow-up     2 mo f/u, Non-radiographic axial spondyloarthritis ([address])    Pt stated pain 2.5/10 - Ankles, feet, hands        History of Present Illness (HPI)     Citlaly Cifuentes  is a(n)53 y.o. female with a hx of undifferentiated spondyloarthropathy here for the f/u evaluation of undifferentiated spondyloarthropathy     Interval hx:    - just started taltz 2/7- tolerated well    pain affecting the fingers, elbows, hips, feet  Pain on a scale 0-10: 3/10  Type of pain: constant   Timing: mornings    Aggravating factors: hands: certain foods, hips/knees: sitting for long periods, back: sitting for long periods   Alleviating factors: movement      Associated symptoms:  + swelling/  Redness/ warmth (fingers), + AM stiffness lasting ~ 30 minutes      REVIEW OF SYSTEMS: (ROS)    Review of Systems   Constitutional:  Negative for fatigue, fever and unexpected weight change. HENT:  Negative for congestion and trouble swallowing. Eyes:  Negative for pain and itching. Dry eyes   Respiratory:  Negative for cough and shortness of breath. Cardiovascular:  Negative for chest pain and leg swelling. Gastrointestinal:  Negative for abdominal pain, constipation, diarrhea and nausea. Endocrine: Negative for cold intolerance and heat intolerance. Genitourinary:  Negative for difficulty urinating, frequency and urgency. Musculoskeletal:  Positive for arthralgias, back pain and joint swelling. Negative for myalgias. Skin:  Negative for rash. Neurological:  Positive for numbness (hands). Negative for dizziness and weakness. Psychiatric/Behavioral:  The patient is not nervous/anxious.       PAST MEDICAL HISTORY      Past Medical History:   Diagnosis Date    Undifferentiated

## 2023-08-24 DIAGNOSIS — M45.A0 NON-RADIOGRAPHIC AXIAL SPONDYLOARTHRITIS (HCC): ICD-10-CM

## 2023-08-24 RX ORDER — IXEKIZUMAB 80 MG/ML
80 INJECTION, SOLUTION SUBCUTANEOUS
Qty: 1 EACH | Refills: 5 | Status: CANCELLED | OUTPATIENT
Start: 2023-08-24

## 2023-08-24 NOTE — TELEPHONE ENCOUNTER
Andrews Matthew called requesting a refill on the following medications:  Requested Prescriptions     Pending Prescriptions Disp Refills    ixekizumab (TALTZ) 80 MG/ML SOAJ prefilled syringe 1 each 5     Sig: Inject 1 mL into the skin every 28 days     Pharmacy verified:[facility]  . pv      Date of last visit: 8-8-23  Date of next visit (if applicable): 82/08/6259

## 2023-08-28 DIAGNOSIS — M45.A0 NON-RADIOGRAPHIC AXIAL SPONDYLOARTHRITIS (HCC): ICD-10-CM

## 2023-08-28 NOTE — TELEPHONE ENCOUNTER
Pranay Hodges called requesting a refill on the following medications:  Requested Prescriptions     Pending Prescriptions Disp Refills    ixekizumab (TALTZ) 80 MG/ML SOAJ prefilled syringe 1 each 5     Sig: Inject 1 mL into the skin every 28 days     Pharmacy verified:[facility]   . pv      Date of last visit:   Date of next visit (if applicable): 45/74/4772

## 2023-08-28 NOTE — TELEPHONE ENCOUNTER
[facility] pharmacy is calling stating that Annesalome Alexander is requesting a 90 day supply instead of 30. Please call or fax Applic[employer] with an update.

## 2023-08-29 RX ORDER — IXEKIZUMAB 80 MG/ML
80 INJECTION, SOLUTION SUBCUTANEOUS
Qty: 3 ML | Refills: 1 | Status: ACTIVE | OUTPATIENT
Start: 2023-08-29

## 2023-08-29 RX ORDER — IXEKIZUMAB 80 MG/ML
80 INJECTION, SOLUTION SUBCUTANEOUS
Qty: 1 EACH | Refills: 5 | Status: ACTIVE | OUTPATIENT
Start: 2023-08-29 | End: 2023-08-29

## 2023-09-23 DIAGNOSIS — M45.A0 NON-RADIOGRAPHIC AXIAL SPONDYLOARTHRITIS (HCC): ICD-10-CM

## 2023-09-25 RX ORDER — NAPROXEN 375 MG/1
375 TABLET ORAL 2 TIMES DAILY
Qty: 60 TABLET | Refills: 3 | Status: SHIPPED | OUTPATIENT
Start: 2023-09-25

## 2023-12-02 ENCOUNTER — HOSPITAL ENCOUNTER (OUTPATIENT)
Dept: GENERAL RADIOLOGY | Age: 53
Discharge: HOME | End: 2023-12-02
Payer: COMMERCIAL

## 2023-12-02 ENCOUNTER — HOSPITAL ENCOUNTER (OUTPATIENT)
Age: 53
Discharge: HOME | End: 2023-12-02
Payer: COMMERCIAL

## 2023-12-02 DIAGNOSIS — Z01.811 PRE-OP CHEST EXAM: ICD-10-CM

## 2023-12-02 LAB
DEPRECATED RDW RBC AUTO: 43 FL (ref 35–45)
EKG ATRIAL RATE: 79 BPM
EKG P AXIS: 68 DEGREES
EKG P-R INTERVAL: 156 MS
EKG Q-T INTERVAL: 392 MS
EKG QRS DURATION: 76 MS
EKG QTC CALCULATION (BAZETT): 449 MS
EKG R AXIS: 95 DEGREES
EKG T AXIS: 69 DEGREES
EKG VENTRICULAR RATE: 79 BPM
ERYTHROCYTE [DISTWIDTH] IN BLOOD BY AUTOMATED COUNT: 12.4 % (ref 11.5–14.5)
HCT VFR BLD AUTO: 40.9 % (ref 37–47)
HGB BLD-MCNC: 13.2 GM/DL (ref 12–16)
MCH RBC QN AUTO: 30.3 PG (ref 26–33)
MCHC RBC AUTO-ENTMCNC: 32.3 GM/DL (ref 32.2–35.5)
MCV RBC AUTO: 93.8 FL (ref 81–99)
PLATELET # BLD AUTO: 216 THOU/MM3 (ref 130–400)
PMV BLD AUTO: 9.6 FL (ref 9.4–12.4)
RBC # BLD AUTO: 4.36 MILL/MM3 (ref 4.2–5.4)
WBC # BLD AUTO: 3.6 THOU/MM3 (ref 4.8–10.8)

## 2023-12-02 PROCEDURE — 93005 ELECTROCARDIOGRAM TRACING: CPT | Performed by: PHYSICIAN ASSISTANT

## 2023-12-02 PROCEDURE — 71046 X-RAY EXAM CHEST 2 VIEWS: CPT

## 2023-12-02 PROCEDURE — 85027 COMPLETE CBC AUTOMATED: CPT

## 2023-12-02 PROCEDURE — 36415 COLL VENOUS BLD VENIPUNCTURE: CPT

## 2024-02-05 ENCOUNTER — NURSE ONLY (OUTPATIENT)
Dept: LAB | Age: 54
End: 2024-02-05

## 2024-02-05 ENCOUNTER — OFFICE VISIT (OUTPATIENT)
Dept: RHEUMATOLOGY | Age: 54
End: 2024-02-05
Payer: COMMERCIAL

## 2024-02-05 VITALS
BODY MASS INDEX: 26.58 KG/M2 | SYSTOLIC BLOOD PRESSURE: 110 MMHG | DIASTOLIC BLOOD PRESSURE: 76 MMHG | HEIGHT: 63 IN | WEIGHT: 150 LBS | OXYGEN SATURATION: 98 % | HEART RATE: 107 BPM

## 2024-02-05 DIAGNOSIS — M45.A0 NON-RADIOGRAPHIC AXIAL SPONDYLOARTHRITIS (HCC): ICD-10-CM

## 2024-02-05 DIAGNOSIS — G89.29 CHRONIC BILATERAL LOW BACK PAIN WITHOUT SCIATICA: ICD-10-CM

## 2024-02-05 DIAGNOSIS — Z79.1 NSAID LONG-TERM USE: ICD-10-CM

## 2024-02-05 DIAGNOSIS — Z51.81 MEDICATION MONITORING ENCOUNTER: ICD-10-CM

## 2024-02-05 DIAGNOSIS — M45.A0 NON-RADIOGRAPHIC AXIAL SPONDYLOARTHRITIS (HCC): Primary | ICD-10-CM

## 2024-02-05 DIAGNOSIS — M54.50 CHRONIC BILATERAL LOW BACK PAIN WITHOUT SCIATICA: ICD-10-CM

## 2024-02-05 LAB
ALBUMIN SERPL BCG-MCNC: 4.7 G/DL (ref 3.5–5.1)
ALP SERPL-CCNC: 71 U/L (ref 38–126)
ALT SERPL W/O P-5'-P-CCNC: 16 U/L (ref 11–66)
ANION GAP SERPL CALC-SCNC: 11 MEQ/L (ref 8–16)
AST SERPL-CCNC: 18 U/L (ref 5–40)
BASOPHILS ABSOLUTE: 0 THOU/MM3 (ref 0–0.1)
BASOPHILS NFR BLD AUTO: 1 %
BILIRUB SERPL-MCNC: 0.7 MG/DL (ref 0.3–1.2)
BUN SERPL-MCNC: 13 MG/DL (ref 7–22)
CALCIUM SERPL-MCNC: 9.6 MG/DL (ref 8.5–10.5)
CHLORIDE SERPL-SCNC: 105 MEQ/L (ref 98–111)
CO2 SERPL-SCNC: 24 MEQ/L (ref 23–33)
CREAT SERPL-MCNC: 0.8 MG/DL (ref 0.4–1.2)
CRP SERPL-MCNC: < 0.3 MG/DL (ref 0–1)
DEPRECATED RDW RBC AUTO: 41.4 FL (ref 35–45)
EOSINOPHIL NFR BLD AUTO: 3.1 %
EOSINOPHILS ABSOLUTE: 0.1 THOU/MM3 (ref 0–0.4)
ERYTHROCYTE [DISTWIDTH] IN BLOOD BY AUTOMATED COUNT: 12 % (ref 11.5–14.5)
ERYTHROCYTE [SEDIMENTATION RATE] IN BLOOD BY WESTERGREN METHOD: 5 MM/HR (ref 0–20)
GFR SERPL CREATININE-BSD FRML MDRD: > 60 ML/MIN/1.73M2
GLUCOSE SERPL-MCNC: 92 MG/DL (ref 70–108)
HCT VFR BLD AUTO: 44.6 % (ref 37–47)
HGB BLD-MCNC: 14.2 GM/DL (ref 12–16)
IMM GRANULOCYTES # BLD AUTO: 0.01 THOU/MM3 (ref 0–0.07)
IMM GRANULOCYTES NFR BLD AUTO: 0.3 %
LYMPHOCYTES ABSOLUTE: 1.3 THOU/MM3 (ref 1–4.8)
LYMPHOCYTES NFR BLD AUTO: 32.7 %
MCH RBC QN AUTO: 29.8 PG (ref 26–33)
MCHC RBC AUTO-ENTMCNC: 31.8 GM/DL (ref 32.2–35.5)
MCV RBC AUTO: 93.5 FL (ref 81–99)
MONOCYTES ABSOLUTE: 0.4 THOU/MM3 (ref 0.4–1.3)
MONOCYTES NFR BLD AUTO: 10.1 %
NEUTROPHILS NFR BLD AUTO: 52.8 %
NRBC BLD AUTO-RTO: 0 /100 WBC
PLATELET # BLD AUTO: 236 THOU/MM3 (ref 130–400)
PMV BLD AUTO: 10.2 FL (ref 9.4–12.4)
POTASSIUM SERPL-SCNC: 4.4 MEQ/L (ref 3.5–5.2)
PROT SERPL-MCNC: 7.5 G/DL (ref 6.1–8)
RBC # BLD AUTO: 4.77 MILL/MM3 (ref 4.2–5.4)
SEGMENTED NEUTROPHILS ABSOLUTE COUNT: 2.1 THOU/MM3 (ref 1.8–7.7)
SODIUM SERPL-SCNC: 140 MEQ/L (ref 135–145)
WBC # BLD AUTO: 3.9 THOU/MM3 (ref 4.8–10.8)

## 2024-02-05 PROCEDURE — G8484 FLU IMMUNIZE NO ADMIN: HCPCS | Performed by: NURSE PRACTITIONER

## 2024-02-05 PROCEDURE — G8419 CALC BMI OUT NRM PARAM NOF/U: HCPCS | Performed by: NURSE PRACTITIONER

## 2024-02-05 PROCEDURE — 3017F COLORECTAL CA SCREEN DOC REV: CPT | Performed by: NURSE PRACTITIONER

## 2024-02-05 PROCEDURE — G8427 DOCREV CUR MEDS BY ELIG CLIN: HCPCS | Performed by: NURSE PRACTITIONER

## 2024-02-05 PROCEDURE — 99214 OFFICE O/P EST MOD 30 MIN: CPT | Performed by: NURSE PRACTITIONER

## 2024-02-05 PROCEDURE — 1036F TOBACCO NON-USER: CPT | Performed by: NURSE PRACTITIONER

## 2024-02-05 ASSESSMENT — ENCOUNTER SYMPTOMS
EYE ITCHING: 0
EYE PAIN: 0
DIARRHEA: 0
COUGH: 0
NAUSEA: 0
TROUBLE SWALLOWING: 0
SHORTNESS OF BREATH: 0
BACK PAIN: 0
ABDOMINAL PAIN: 0
CONSTIPATION: 0

## 2024-02-05 NOTE — PROGRESS NOTES
components found for: \"HGBA1C\"    Lab Results   Component Value Date/Time    VITD25 71 08/26/2021 09:39 AM         Lab Results   Component Value Date    ANASCRN None Detected 01/03/2019     Lab Results   Component Value Date    SSA SEE BELOW 12/04/2020     Lab Results   Component Value Date    SSB 1 12/04/2020     No results found for: \"ANTI-SMITH\"  No results found for: \"DSDNAAB\"   No results found for: \"ANTIRNP\"  No results found for: \"C3\", \"C4\"  Lab Results   Component Value Date    CCPAB 5 07/19/2019     Lab Results   Component Value Date    RF < 10 01/03/2019       No components found for: \"CANCASCRN\", \"APANCASCRN\"  Lab Results   Component Value Date    SEDRATE 29 (H) 05/22/2023     Lab Results   Component Value Date    CRP 0.48 05/22/2023       RADIOLOGY:         ASSESSMENT/PLAN    Assessment   Plan     Undiff spondyloarthritis  Non radiographic axial spondyloarthropathy  - synovitis, enthesitis, chronic low back pain  - prior tx: methotrexate (fatigue), enbrel (leukopenia), arava (rash), methotrexate 7.5 (increased joint pains, fatigue), cosentyx 150 mg (cont disease)   - restart taltz 80 mg subcu w 4 weeks (2/2023)   - naproxen 375 mg BID PRN   - pepcid 20 mg daily    Chronic bilateral low back pain without sciatica  - + inflammatory features. No red flag symptoms  - xray lumbar spine w/o abnormalities. MRI pelvis w/o sacroiliitis.     Paresthesia bilat hands- mild intermittent symptoms   - neg tinel wrist and elbows, + phalen- ? CTS    Dry eyes  - negative SSA, SSB- ? Related to #1    Medication monitoring   - CBC, CMP, sed rate, CRP q 4 months      No follow-ups on file.        Electronically signed by VIVIANA Cordova CNP on 2/5/2024 at 8:22 AM    New Prescriptions    No medications on file       Thank you for allowing me to participate in the care of this patient.   Please call if there are any questions.  
Normal rate, regular rhythm.  Heart sounds S1, S2.  No murmurs, rubs or gallops.

## 2024-02-08 LAB
GAMMA INTERFERON BACKGROUND BLD IA-ACNC: 0.02 IU/ML
M TB IFN-G BLD-IMP: NEGATIVE
M TB IFN-G CD4+ BCKGRND COR BLD-ACNC: 0.02 IU/ML (ref 0–0.34)
M TB IFN-G CD4+CD8+ BCKGRND COR BLD-ACNC: 0.03 IU/ML (ref 0–0.34)
MITOGEN IGNF BCKGRD COR BLD-ACNC: 8.98 IU/ML

## 2024-02-19 ENCOUNTER — NURSE ONLY (OUTPATIENT)
Dept: LAB | Age: 54
End: 2024-02-19

## 2024-02-29 LAB — CYTOLOGY THIN PREP PAP: NORMAL

## 2024-03-17 DIAGNOSIS — M45.A0 NON-RADIOGRAPHIC AXIAL SPONDYLOARTHRITIS (HCC): ICD-10-CM

## 2024-03-18 RX ORDER — NAPROXEN 375 MG/1
375 TABLET ORAL 2 TIMES DAILY
Qty: 60 TABLET | Refills: 3 | Status: SHIPPED | OUTPATIENT
Start: 2024-03-18

## 2024-03-21 RX ORDER — IXEKIZUMAB 80 MG/ML
INJECTION, SOLUTION SUBCUTANEOUS
Qty: 1 EACH | Refills: 1 | Status: SHIPPED | OUTPATIENT
Start: 2024-03-21

## 2024-05-14 ENCOUNTER — OFFICE VISIT (OUTPATIENT)
Dept: RHEUMATOLOGY | Age: 54
End: 2024-05-14
Payer: COMMERCIAL

## 2024-05-14 VITALS
HEIGHT: 63 IN | BODY MASS INDEX: 26.61 KG/M2 | WEIGHT: 150.2 LBS | OXYGEN SATURATION: 99 % | DIASTOLIC BLOOD PRESSURE: 74 MMHG | HEART RATE: 78 BPM | SYSTOLIC BLOOD PRESSURE: 126 MMHG

## 2024-05-14 DIAGNOSIS — M45.A0 NON-RADIOGRAPHIC AXIAL SPONDYLOARTHRITIS (HCC): Primary | ICD-10-CM

## 2024-05-14 DIAGNOSIS — G89.29 CHRONIC BILATERAL LOW BACK PAIN WITHOUT SCIATICA: ICD-10-CM

## 2024-05-14 DIAGNOSIS — M54.50 CHRONIC BILATERAL LOW BACK PAIN WITHOUT SCIATICA: ICD-10-CM

## 2024-05-14 DIAGNOSIS — Z79.1 NSAID LONG-TERM USE: ICD-10-CM

## 2024-05-14 DIAGNOSIS — Z51.81 MEDICATION MONITORING ENCOUNTER: ICD-10-CM

## 2024-05-14 DIAGNOSIS — D72.819 LEUKOPENIA, UNSPECIFIED TYPE: ICD-10-CM

## 2024-05-14 PROCEDURE — G8427 DOCREV CUR MEDS BY ELIG CLIN: HCPCS | Performed by: INTERNAL MEDICINE

## 2024-05-14 PROCEDURE — 99214 OFFICE O/P EST MOD 30 MIN: CPT | Performed by: INTERNAL MEDICINE

## 2024-05-14 PROCEDURE — G8419 CALC BMI OUT NRM PARAM NOF/U: HCPCS | Performed by: INTERNAL MEDICINE

## 2024-05-14 PROCEDURE — 3017F COLORECTAL CA SCREEN DOC REV: CPT | Performed by: INTERNAL MEDICINE

## 2024-05-14 PROCEDURE — 1036F TOBACCO NON-USER: CPT | Performed by: INTERNAL MEDICINE

## 2024-05-14 RX ORDER — FAMOTIDINE 20 MG/1
20 TABLET, FILM COATED ORAL 2 TIMES DAILY
Qty: 60 TABLET | Refills: 3 | Status: SHIPPED | OUTPATIENT
Start: 2024-05-14

## 2024-05-14 ASSESSMENT — ENCOUNTER SYMPTOMS
RESPIRATORY NEGATIVE: 1
EYES NEGATIVE: 1
GASTROINTESTINAL NEGATIVE: 1

## 2024-05-14 ASSESSMENT — JOINT PAIN
TOTAL NUMBER OF SWOLLEN JOINTS: 0
TOTAL NUMBER OF TENDER JOINTS: 2

## 2024-05-14 NOTE — PROGRESS NOTES
ixekizumab (TALTZ) 80 MG/ML SOAJ prefilled syringe INJECT ONE PEN SUBCUTANEOUSLY EVERY 28 DAYS. REFRIGERATE. ALLOW PEN TO REACH ROOM TEMP PRIOR TO INJECTION.    naproxen (NAPROSYN) 375 mg, Oral, 2 TIMES DAILY    Serdexmethylphen-Dexmethylphen (AZSTARYS) 52.3-10.4 MG CAPS No dose, route, or frequency recorded.           PHYSICAL EXAMINATION / OBJECTIVE   Objective:  /74 (Site: Right Upper Arm, Position: Sitting, Cuff Size: Medium Adult)   Pulse 78   Ht 1.6 m (5' 2.99\")   Wt 68.1 kg (150 lb 3.2 oz)   SpO2 99%   BMI 26.61 kg/m²     Physical Exam  Vitals reviewed.   Constitutional:       Appearance: She is well-developed.   Cardiovascular:      Rate and Rhythm: Normal rate and regular rhythm.   Pulmonary:      Effort: Pulmonary effort is normal.      Breath sounds: Normal breath sounds.   Musculoskeletal:         General: Tenderness present.      Cervical back: Normal range of motion and neck supple.   Skin:     General: Skin is warm and dry.      Findings: No rash.      Comments: White to blue color change of the feet.    Neurological:      Mental Status: She is alert and oriented to person, place, and time.   Psychiatric:         Thought Content: Thought content normal.       Ankles: tender -- bilateral   Feet: tender mid soles, mtps and posterior heals at achellies tendon insertion site.     Spine:  tender left sacral sulci     RAPID3 Composite Score = MDHAQ (0-10) + Patient pain VAS (0-10): + Patient global assessment VAS (0-10):     5/14/2024 --- RAPID 3: 2 + 7 + 3 = 12     Remission: <3  Low Disease Activity: <6  Moderate Disease Activity: >=6 and <=12  High Disease Activity: >12       LABS    CBC  Lab Results   Component Value Date/Time    WBC 3.9 02/05/2024 09:18 AM    RBC 4.77 02/05/2024 09:18 AM    RBC 4.34 04/23/2012 04:35 PM    HGB 14.2 02/05/2024 09:18 AM    HGB 13.6 04/23/2012 04:35 PM    HCT 44.6 02/05/2024 09:18 AM    MCV 93.5 02/05/2024 09:18 AM    MCH 29.8 02/05/2024 09:18 AM    MCHC 31.8

## 2024-08-01 ENCOUNTER — PATIENT MESSAGE (OUTPATIENT)
Dept: RHEUMATOLOGY | Age: 54
End: 2024-08-01

## 2024-08-01 DIAGNOSIS — Z51.81 MEDICATION MONITORING ENCOUNTER: Primary | ICD-10-CM

## 2024-08-01 NOTE — TELEPHONE ENCOUNTER
From: Carmen Morales  To: Dr. David Moran  Sent: 8/1/2024 10:31 AM EDT  Subject: Blood work for next visit?    Hi, my appointment isn’t until the end of the month 8/29, but I cannot remember if I was supposed to have bloodwork done prior to that visit. I don’t see it in [facility]…but last bloodwork was Feb, and I hadn’t been taking the Taltz since Oct…. No biggie if not just wanted to give myself time if bloodwork is required. Thank you!

## 2024-08-03 DIAGNOSIS — M45.A0 NON-RADIOGRAPHIC AXIAL SPONDYLOARTHRITIS (HCC): ICD-10-CM

## 2024-08-05 RX ORDER — NAPROXEN 375 MG/1
375 TABLET ORAL 2 TIMES DAILY
Qty: 60 TABLET | Refills: 3 | Status: SHIPPED | OUTPATIENT
Start: 2024-08-05

## 2024-08-05 RX ORDER — IXEKIZUMAB 80 MG/ML
INJECTION, SOLUTION SUBCUTANEOUS
Qty: 1 EACH | Refills: 1 | Status: ACTIVE | OUTPATIENT
Start: 2024-08-05

## 2024-08-12 ENCOUNTER — LAB (OUTPATIENT)
Dept: LAB | Age: 54
End: 2024-08-12

## 2024-08-12 DIAGNOSIS — Z51.81 MEDICATION MONITORING ENCOUNTER: ICD-10-CM

## 2024-08-12 LAB
ALBUMIN SERPL BCG-MCNC: 4.3 G/DL (ref 3.5–5.1)
ALP SERPL-CCNC: 95 U/L (ref 38–126)
ALT SERPL W/O P-5'-P-CCNC: 40 U/L (ref 11–66)
ANION GAP SERPL CALC-SCNC: 12 MEQ/L (ref 8–16)
AST SERPL-CCNC: 31 U/L (ref 5–40)
BASOPHILS ABSOLUTE: 0 THOU/MM3 (ref 0–0.1)
BASOPHILS NFR BLD AUTO: 0.8 %
BILIRUB SERPL-MCNC: 1.4 MG/DL (ref 0.3–1.2)
BUN SERPL-MCNC: 19 MG/DL (ref 7–22)
CALCIUM SERPL-MCNC: 9.4 MG/DL (ref 8.5–10.5)
CHLORIDE SERPL-SCNC: 101 MEQ/L (ref 98–111)
CO2 SERPL-SCNC: 26 MEQ/L (ref 23–33)
CREAT SERPL-MCNC: 0.7 MG/DL (ref 0.4–1.2)
CRP SERPL-MCNC: 0.84 MG/DL (ref 0–1)
DEPRECATED RDW RBC AUTO: 43 FL (ref 35–45)
EOSINOPHIL NFR BLD AUTO: 2.9 %
EOSINOPHILS ABSOLUTE: 0.1 THOU/MM3 (ref 0–0.4)
ERYTHROCYTE [DISTWIDTH] IN BLOOD BY AUTOMATED COUNT: 12.9 % (ref 11.5–14.5)
ERYTHROCYTE [SEDIMENTATION RATE] IN BLOOD BY WESTERGREN METHOD: 19 MM/HR (ref 0–20)
GFR SERPL CREATININE-BSD FRML MDRD: > 90 ML/MIN/1.73M2
GLUCOSE SERPL-MCNC: 92 MG/DL (ref 70–108)
HCT VFR BLD AUTO: 41.4 % (ref 37–47)
HGB BLD-MCNC: 13.6 GM/DL (ref 12–16)
IMM GRANULOCYTES # BLD AUTO: 0.03 THOU/MM3 (ref 0–0.07)
IMM GRANULOCYTES NFR BLD AUTO: 0.8 %
LYMPHOCYTES ABSOLUTE: 1.3 THOU/MM3 (ref 1–4.8)
LYMPHOCYTES NFR BLD AUTO: 33.5 %
MCH RBC QN AUTO: 30.4 PG (ref 26–33)
MCHC RBC AUTO-ENTMCNC: 32.9 GM/DL (ref 32.2–35.5)
MCV RBC AUTO: 92.4 FL (ref 81–99)
MONOCYTES ABSOLUTE: 0.5 THOU/MM3 (ref 0.4–1.3)
MONOCYTES NFR BLD AUTO: 13.2 %
NEUTROPHILS ABSOLUTE: 1.9 THOU/MM3 (ref 1.8–7.7)
NEUTROPHILS NFR BLD AUTO: 48.8 %
NRBC BLD AUTO-RTO: 0 /100 WBC
PLATELET # BLD AUTO: 222 THOU/MM3 (ref 130–400)
PMV BLD AUTO: 9.9 FL (ref 9.4–12.4)
POTASSIUM SERPL-SCNC: 4.4 MEQ/L (ref 3.5–5.2)
PROT SERPL-MCNC: 7.6 G/DL (ref 6.1–8)
RBC # BLD AUTO: 4.48 MILL/MM3 (ref 4.2–5.4)
SODIUM SERPL-SCNC: 139 MEQ/L (ref 135–145)
WBC # BLD AUTO: 3.8 THOU/MM3 (ref 4.8–10.8)

## 2024-08-13 ENCOUNTER — TELEPHONE (OUTPATIENT)
Dept: RHEUMATOLOGY | Age: 54
End: 2024-08-13

## 2024-08-13 RX ORDER — PREDNISONE 5 MG/1
TABLET ORAL
Qty: 40 TABLET | Refills: 0 | Status: SHIPPED | OUTPATIENT
Start: 2024-08-13 | End: 2024-08-29

## 2024-08-13 NOTE — TELEPHONE ENCOUNTER
Patient notified and verbalized understanding.     Patient stated she is in a lot of pain. She had scheduled Taltz injection today. She has follow up on 8/29/24. She would like to discuss med changes at upcoming appointment. She is ok with keeping her appointment on the 8/29/24    Where is pain located?  Everywhere, feet, ankles, knees, hips, sacral joint, wrists, hands, hurts to walk-ankles and heals very sore, go down stairs, fatigue. Hurts to sit down, hurts to stand up  When did the pain start?  Really bad since Middle of June; was excruciating until beginning of July. Has been bad again for past 2 weeks  Rate the pain 1-10. Ten being the worst  7  Describe the pain.  (Dull, Aching, Stabbing, radiating, etc)  constant,   Has this pain occurred in the past?  No, it has never been in her knees; her hips hurt to touch, hurts all the way down her legs, from hips down to feet.   What have you used to alleviate this pain?  Naproxen, hot tub, heat, ice, sitting in sauna,   What aggravates it?  Movement, walking, being stationary for long periods of time.   Joint swelling or redness?  Swelling in hands, wrists, ankles feet; redness with the swelling, warm to touch; she is having a lot of night sweats; she is not sleeping well

## 2024-08-13 NOTE — TELEPHONE ENCOUNTER
----- Message from VIVIANA Bangura - CNP sent at 8/13/2024 10:42 AM EDT -----  Blood testing with a low white blood cell count which is stable, otherwise no other significant abnormalities.

## 2024-08-29 ENCOUNTER — LAB (OUTPATIENT)
Dept: LAB | Age: 54
End: 2024-08-29

## 2024-08-29 ENCOUNTER — OFFICE VISIT (OUTPATIENT)
Dept: RHEUMATOLOGY | Age: 54
End: 2024-08-29
Payer: COMMERCIAL

## 2024-08-29 VITALS
BODY MASS INDEX: 28.14 KG/M2 | HEART RATE: 94 BPM | SYSTOLIC BLOOD PRESSURE: 118 MMHG | OXYGEN SATURATION: 98 % | HEIGHT: 63 IN | DIASTOLIC BLOOD PRESSURE: 66 MMHG | WEIGHT: 158.8 LBS

## 2024-08-29 DIAGNOSIS — Z51.81 MEDICATION MONITORING ENCOUNTER: ICD-10-CM

## 2024-08-29 DIAGNOSIS — D72.819 LEUKOPENIA, UNSPECIFIED TYPE: ICD-10-CM

## 2024-08-29 DIAGNOSIS — M99.04 SACRAL REGION SOMATIC DYSFUNCTION: ICD-10-CM

## 2024-08-29 DIAGNOSIS — M45.A0 NON-RADIOGRAPHIC AXIAL SPONDYLOARTHRITIS (HCC): Primary | ICD-10-CM

## 2024-08-29 DIAGNOSIS — Z79.1 NSAID LONG-TERM USE: ICD-10-CM

## 2024-08-29 DIAGNOSIS — M72.2 PLANTAR FASCIITIS: ICD-10-CM

## 2024-08-29 PROCEDURE — G8419 CALC BMI OUT NRM PARAM NOF/U: HCPCS | Performed by: INTERNAL MEDICINE

## 2024-08-29 PROCEDURE — 1036F TOBACCO NON-USER: CPT | Performed by: INTERNAL MEDICINE

## 2024-08-29 PROCEDURE — G8427 DOCREV CUR MEDS BY ELIG CLIN: HCPCS | Performed by: INTERNAL MEDICINE

## 2024-08-29 PROCEDURE — 99214 OFFICE O/P EST MOD 30 MIN: CPT | Performed by: INTERNAL MEDICINE

## 2024-08-29 PROCEDURE — 3017F COLORECTAL CA SCREEN DOC REV: CPT | Performed by: INTERNAL MEDICINE

## 2024-08-29 RX ORDER — GOLIMUMAB 50 MG/.5ML
50 INJECTION, SOLUTION SUBCUTANEOUS
Qty: 1.5 ML | Refills: 1 | Status: ACTIVE | OUTPATIENT
Start: 2024-08-29

## 2024-08-29 ASSESSMENT — ENCOUNTER SYMPTOMS
RESPIRATORY NEGATIVE: 1
COLOR CHANGE: 1
EYES NEGATIVE: 1
GASTROINTESTINAL NEGATIVE: 1

## 2024-08-29 NOTE — PROGRESS NOTES
[facility] RHEUMATOLOGY FOLLOW UP NOTE       Date Of Service: 8/29/2024  Provider: MIRYAM STONER DO    Name: Carmen Morales   MRN: [id number]    Chief Complaint(s)     Chief Complaint   Patient presents with    Follow-up     3 month follow up non-radiographic axial spondyloarthritis         History of Present Illness (HPI)     Carmen Morales  is a(n)54 y.o. female with a hx of undifferentiated spondyloarthropathy here for the f/u evaluation of undifferentiated spondyloarthropathy     -- increased SI joint pains with radiation into the posterior thighs - started mid June 2024  -- currently pain in the lower back, hips, feet, ankles, and hands  -- pain up to 6.5/10 over the past week.   -- pain  swelling bilateral hands, and feet.   -- Timing: mornings    -- Aggravating factors: hips/knees: sitting for long periods, inactivityFoot pain with intial wt bearing. . Back  - laying down at night.   -- Alleviating factors: movement    -- Night time waking from pain.   -- Morning stiffness > at least 45 minutes.   -- weakness of the legs - episodic - after prolonged sitting - with reported difficulty getting up           REVIEW OF SYSTEMS: (ROS)    Review of Systems   Constitutional:  Positive for fatigue.   HENT: Negative.     Eyes: Negative.    Respiratory: Negative.     Cardiovascular: Negative.    Gastrointestinal: Negative.    Endocrine: Negative.    Genitourinary: Negative.    Skin:  Positive for color change (feet).   Neurological:  Positive for weakness and numbness.   Hematological: Negative.      PmHx:  has a past medical history of Breast implant removal status and Undifferentiated spondyloarthropathy.     Social History:  reports that she has never smoked. She has never used smokeless tobacco. She reports current alcohol use of about 5.0 standard drinks of alcohol per week. She reports that she does not use drugs.    Allergies   Allergen Reactions    Hornet Venom Angioedema, Anxiety, Dermatitis,

## 2024-09-01 DIAGNOSIS — Z79.1 NSAID LONG-TERM USE: ICD-10-CM

## 2024-09-01 DIAGNOSIS — Z51.81 MEDICATION MONITORING ENCOUNTER: ICD-10-CM

## 2024-09-03 RX ORDER — FAMOTIDINE 20 MG/1
20 TABLET, FILM COATED ORAL 2 TIMES DAILY
Qty: 180 TABLET | Refills: 1 | Status: SHIPPED | OUTPATIENT
Start: 2024-09-03

## 2024-09-04 ENCOUNTER — TELEPHONE (OUTPATIENT)
Dept: RHEUMATOLOGY | Age: 54
End: 2024-09-04

## 2024-09-04 NOTE — TELEPHONE ENCOUNTER
Received call from [facility] stating Simponi was previously approved for the Simponi Aria. Per [facility], Simponi has now been approved. They will fax over approval information along with this approval for:  9/4/2024-9/4/2025  # [id number]   Troponin resulted, 0.01 increased to 0.05. Pt currently asymptomatic and resting comfortably. Tele event monitor reviewed with tech and no alarms or arrhythmias present overnight. HR sustained 70's to 80's. Pt around 4 am with some artifact on monitor as blood was being drawn. Troponin resulted, 0.01 increased to 0.05. Pt currently asymptomatic and resting comfortably. Tele event monitor reviewed with tech and no alarms or arrhythmias present overnight. HR sustained 70's to 80's. Pt around 4 am with some artifact on monitor as blood was being drawn. EKG repeated and reviewed with no acute STT elevation or new T wave depression. Will f/u on rpt trop ordered for 7am. Troponemia likely reactive from demand ischemia during arrythmia.

## 2024-10-01 ENCOUNTER — HOSPITAL ENCOUNTER (OUTPATIENT)
Dept: WOMENS IMAGING | Age: 54
Discharge: HOME | End: 2024-10-01
Payer: COMMERCIAL

## 2024-10-01 DIAGNOSIS — Z12.31 VISIT FOR SCREENING MAMMOGRAM: ICD-10-CM

## 2024-10-01 PROCEDURE — 77063 BREAST TOMOSYNTHESIS BI: CPT

## 2024-11-07 ENCOUNTER — OFFICE VISIT (OUTPATIENT)
Dept: RHEUMATOLOGY | Age: 54
End: 2024-11-07
Payer: COMMERCIAL

## 2024-11-07 VITALS
HEIGHT: 63 IN | OXYGEN SATURATION: 97 % | BODY MASS INDEX: 25.69 KG/M2 | DIASTOLIC BLOOD PRESSURE: 72 MMHG | WEIGHT: 145 LBS | SYSTOLIC BLOOD PRESSURE: 108 MMHG | HEART RATE: 92 BPM

## 2024-11-07 DIAGNOSIS — Z51.81 MEDICATION MONITORING ENCOUNTER: ICD-10-CM

## 2024-11-07 DIAGNOSIS — M45.A0 NON-RADIOGRAPHIC AXIAL SPONDYLOARTHRITIS (HCC): Primary | ICD-10-CM

## 2024-11-07 DIAGNOSIS — M72.2 PLANTAR FASCIITIS: ICD-10-CM

## 2024-11-07 DIAGNOSIS — D72.819 LEUKOPENIA, UNSPECIFIED TYPE: ICD-10-CM

## 2024-11-07 PROCEDURE — G8419 CALC BMI OUT NRM PARAM NOF/U: HCPCS | Performed by: NURSE PRACTITIONER

## 2024-11-07 PROCEDURE — 1036F TOBACCO NON-USER: CPT | Performed by: NURSE PRACTITIONER

## 2024-11-07 PROCEDURE — G8427 DOCREV CUR MEDS BY ELIG CLIN: HCPCS | Performed by: NURSE PRACTITIONER

## 2024-11-07 PROCEDURE — G8484 FLU IMMUNIZE NO ADMIN: HCPCS | Performed by: NURSE PRACTITIONER

## 2024-11-07 PROCEDURE — 3017F COLORECTAL CA SCREEN DOC REV: CPT | Performed by: NURSE PRACTITIONER

## 2024-11-07 PROCEDURE — 99214 OFFICE O/P EST MOD 30 MIN: CPT | Performed by: NURSE PRACTITIONER

## 2024-11-07 RX ORDER — COVID-19 ANTIGEN TEST
KIT MISCELLANEOUS PRN
COMMUNITY

## 2024-11-07 RX ORDER — UBROGEPANT 100 MG/1
TABLET ORAL
COMMUNITY
Start: 2024-10-24

## 2024-11-07 RX ORDER — EPINEPHRINE 0.3 MG/.3ML
INJECTION SUBCUTANEOUS
COMMUNITY
Start: 2024-08-13

## 2024-11-07 ASSESSMENT — ENCOUNTER SYMPTOMS
EYE PAIN: 0
SHORTNESS OF BREATH: 0
ABDOMINAL PAIN: 0
BACK PAIN: 0
COUGH: 0
NAUSEA: 0
EYE ITCHING: 0
TROUBLE SWALLOWING: 0
CONSTIPATION: 0
DIARRHEA: 0

## 2024-11-07 NOTE — PROGRESS NOTES
[facility] RHEUMATOLOGY FOLLOW UP NOTE       Date Of Service: 11/7/2024  Provider: Amy Baugh, APRN - CNP    Name: Carmen Morales   MRN: [id number]    Chief Complaint(s)     Chief Complaint   Patient presents with    Follow-up     2-month f/u for non-radiographic axial spondyloarthritis.         History of Present Illness (HPI)     Carmen Morales  is a(n)54 y.o. female with a hx of undifferentiated spondyloarthropathy here for the f/u evaluation of undifferentiated spondyloarthropathy     Interval hx:    - started simponi in September with significant relief     pain affecting the hips   Pain on a scale 0-10: 2/10  Type of pain: intermittent   Timing: mornings    Aggravating factors: hips: sitting for prolonged periods   Alleviating factors: movement      Associated symptoms:  denies swelling/  Redness/ warmth, + AM stiffness lasting ~ < 10 mins       REVIEW OF SYSTEMS: (ROS)    Review of Systems   Constitutional:  Negative for fatigue, fever and unexpected weight change.   HENT:  Negative for congestion and trouble swallowing.    Eyes:  Negative for pain and itching.   Respiratory:  Negative for cough and shortness of breath.    Cardiovascular:  Negative for chest pain and leg swelling.   Gastrointestinal:  Negative for abdominal pain, constipation, diarrhea and nausea.   Endocrine: Negative for cold intolerance and heat intolerance.   Genitourinary:  Negative for difficulty urinating, frequency and urgency.   Musculoskeletal:  Positive for arthralgias. Negative for back pain, joint swelling and myalgias.   Skin:  Negative for rash.   Neurological:  Negative for dizziness, weakness and numbness.   Psychiatric/Behavioral:  The patient is not nervous/anxious.        PAST MEDICAL HISTORY      Past Medical History:   Diagnosis Date    Breast implant removal status 01/03/2024    Undifferentiated spondyloarthropathy 2015       PAST SURGICAL HISTORY      Past Surgical History:   Procedure Laterality

## 2024-12-29 DIAGNOSIS — M45.A0 NON-RADIOGRAPHIC AXIAL SPONDYLOARTHRITIS (HCC): ICD-10-CM

## 2025-02-03 DIAGNOSIS — Z79.1 NSAID LONG-TERM USE: ICD-10-CM

## 2025-02-03 DIAGNOSIS — M45.A0 NON-RADIOGRAPHIC AXIAL SPONDYLOARTHRITIS (HCC): ICD-10-CM

## 2025-02-03 RX ORDER — GOLIMUMAB 50 MG/.5ML
INJECTION, SOLUTION SUBCUTANEOUS
Qty: 0.5 ML | Refills: 6 | Status: ACTIVE | OUTPATIENT
Start: 2025-02-03

## 2025-02-19 NOTE — TELEPHONE ENCOUNTER
Caller Name: Madeline Oliva on behalf of Stacy Oliva    Call Back Number: [phone]    How would the patient prefer to be contacted with a response: Phone call OK to leave a detailed message    Sister of patient called asking if Dr. Vanessa can do a standing order for her to get her chest fluid drained. She also had questions about a referral, I did let her know that it will take a couple of days to hear from them, I did provide a phone number for the referral department.     PA approval from 01/13/22-01/13/23    See copy scanned